# Patient Record
Sex: MALE | Race: BLACK OR AFRICAN AMERICAN | NOT HISPANIC OR LATINO
[De-identification: names, ages, dates, MRNs, and addresses within clinical notes are randomized per-mention and may not be internally consistent; named-entity substitution may affect disease eponyms.]

---

## 2017-01-04 ENCOUNTER — HOSPITAL (OUTPATIENT)
Dept: OTHER | Age: 72
End: 2017-01-04
Attending: INTERNAL MEDICINE

## 2017-01-11 ENCOUNTER — HOSPITAL (OUTPATIENT)
Dept: OTHER | Age: 72
End: 2017-01-11
Attending: INTERNAL MEDICINE

## 2017-01-11 LAB
A/G RATIO_: 1.1
ABS LYMPH: 0.8 K/CUMM (ref 1–3.5)
ABS MONO: 0.5 K/CUMM (ref 0.1–0.8)
ABS NEUTRO: 3.6 K/CUMM (ref 2–8)
ALBUMIN: 3.4 G/DL (ref 3.5–5)
ALK PHOS: 74 UNIT/L (ref 50–124)
ALT/GPT: 53 UNIT/L (ref 0–55)
ANION GAP SERPL CALC-SCNC: 18 MEQ/L (ref 10–20)
APTT PPP: 42 SECONDS (ref 22–30)
AST/GOT: 69 UNIT/L (ref 5–34)
BASOPHIL: 1 % (ref 0–1)
BILI TOTAL: 1.6 MG/DL (ref 0.2–1)
BUN SERPL-MCNC: 37 MG/DL (ref 6–20)
CALCIUM: 8.8 MG/DL (ref 8.4–10.2)
CHLORIDE: 92 MEQ/L (ref 97–107)
CREATININE: 7.23 MG/DL (ref 0.6–1.3)
DIFF_TYPE?: ABNORMAL
EOSINOPHIL: 0 % (ref 0–6)
GLOBULIN_: 3 G/DL (ref 2–4.1)
GLUCOSE LVL: 92 MG/DL (ref 70–99)
HCT VFR BLD CALC: 32 % (ref 36–51)
HEMOLYSIS 2+: NEGATIVE
HEMOLYSIS 4+: NEGATIVE
HGB BLD-MCNC: 10 G/DL (ref 12–17)
ICTERIC 4+: NEGATIVE
IMMATURE GRAN: 0.4 % (ref 0–0.3)
INR: 3.4 (ref 0.9–1.1)
INSTR WBC: 5 K/CUMM (ref 4–11)
LACTIC ACID: 1.1 MMOL/L (ref 0.5–2.2)
LACTIC ACID: 3.8 MMOL/L (ref 0.5–2.2)
LIPEMIC 1+: NEGATIVE
LIPEMIC 1+: NEGATIVE
LIPEMIC 3+: NEGATIVE
LYMPHOCYTE: 16 %
MCH RBC QN AUTO: 32 PG (ref 25–35)
MCHC RBC AUTO-ENTMCNC: 32 G/DL (ref 32–37)
MCV RBC AUTO: 100 FL (ref 78–97)
MONOCYTE: 10 %
NEUTROPHIL: 73 %
NRBC BLD MANUAL-RTO: 0.8 % (ref 0–0.2)
PLATELET: 89 K/CUMM (ref 150–450)
POTASSIUM: 4.9 MEQ/L (ref 3.5–5.1)
PROTHROMBIN TIME: 36 SECONDS (ref 9.7–11.6)
RBC # BLD: 3.14 M/CUMM (ref 4.2–6)
RDW: 17.9 % (ref 11.5–14.5)
SODIUM: 137 MEQ/L (ref 136–145)
TCO2: 32 MEQ/L (ref 19–29)
TOTAL PROTEIN: 6.4 G/DL (ref 6.4–8.3)
TROPONIN I: 0.1 NG/ML
TROPONIN I: 0.13 NG/ML
WBC # BLD: 5 K/CUMM (ref 4–11)

## 2017-01-12 LAB
ABS LYMPH: 0.8 K/CUMM (ref 1–3.5)
ABS MONO: 0.4 K/CUMM (ref 0.1–0.8)
ABS NEUTRO: 3 K/CUMM (ref 2–8)
ANION GAP SERPL CALC-SCNC: 16 MEQ/L (ref 10–20)
BASOPHIL: 1 % (ref 0–1)
BUN SERPL-MCNC: 47 MG/DL (ref 6–20)
CALCIUM: 9.1 MG/DL (ref 8.4–10.2)
CHLORIDE: 91 MEQ/L (ref 97–107)
CREATININE: 8.35 MG/DL (ref 0.6–1.3)
DIFF_TYPE?: ABNORMAL
EOSINOPHIL: 3 % (ref 0–6)
GLUCOSE LVL: 101 MG/DL (ref 70–99)
HBSAG: NON REACTIVE
HCT VFR BLD CALC: 32 % (ref 36–51)
HEMOLYSIS 2+: NEGATIVE
HEMOLYSIS 4+: NEGATIVE
HEP BS AG INSTR: 0.17
HGB BLD-MCNC: 10.2 G/DL (ref 12–17)
IMMATURE GRAN: 0.2 % (ref 0–0.3)
INR: 3.8 (ref 0.9–1.1)
INSTR WBC: 4.3 K/CUMM (ref 4–11)
LYMPHOCYTE: 18 %
MCH RBC QN AUTO: 32 PG (ref 25–35)
MCHC RBC AUTO-ENTMCNC: 32 G/DL (ref 32–37)
MCV RBC AUTO: 100 FL (ref 78–97)
MONOCYTE: 10 %
NEUTROPHIL: 69 %
NRBC BLD MANUAL-RTO: 0.9 % (ref 0–0.2)
PLATELET: 76 K/CUMM (ref 150–450)
POTASSIUM: 5.4 MEQ/L (ref 3.5–5.1)
PROTHROMBIN TIME: 40.3 SECONDS (ref 9.7–11.6)
RBC # BLD: 3.21 M/CUMM (ref 4.2–6)
RDW: 17.4 % (ref 11.5–14.5)
SODIUM: 136 MEQ/L (ref 136–145)
TCO2: 34 MEQ/L (ref 19–29)
WBC # BLD: 4.3 K/CUMM (ref 4–11)

## 2017-01-13 LAB
INR: 2.4 (ref 0.9–1.1)
PROTHROMBIN TIME: 24.6 SECONDS (ref 9.7–11.6)

## 2017-01-14 LAB
ABS NEUTRO: 3.5 K/CUMM (ref 2–8)
ANION GAP SERPL CALC-SCNC: 16 MEQ/L (ref 10–20)
BUN SERPL-MCNC: 46 MG/DL (ref 6–20)
CALCIUM: 8.7 MG/DL (ref 8.4–10.2)
CHLORIDE: 96 MEQ/L (ref 97–107)
CREATININE: 7.58 MG/DL (ref 0.6–1.3)
GLUCOSE LVL: 110 MG/DL (ref 70–99)
HCT VFR BLD CALC: 31 % (ref 36–51)
HEMOLYSIS 2+: NEGATIVE
HGB BLD-MCNC: 10.1 G/DL (ref 12–17)
INR: 2.4 (ref 0.9–1.1)
INSTR WBC: 4.8 K/CUMM (ref 4–11)
LEGION PN UR AG: NORMAL
MCH RBC QN AUTO: 32 PG (ref 25–35)
MCHC RBC AUTO-ENTMCNC: 32 G/DL (ref 32–37)
MCV RBC AUTO: 98 FL (ref 78–97)
NRBC BLD MANUAL-RTO: 0.8 % (ref 0–0.2)
PLATELET: 82 K/CUMM (ref 150–450)
POTASSIUM: 4.5 MEQ/L (ref 3.5–5.1)
PROCALCITONIN LVL: 6.14
PROTHROMBIN TIME: 24.8 SECONDS (ref 9.7–11.6)
RBC # BLD: 3.2 M/CUMM (ref 4.2–6)
RDW: 17.4 % (ref 11.5–14.5)
SODIUM: 136 MEQ/L (ref 136–145)
TCO2: 29 MEQ/L (ref 19–29)
WBC # BLD: 4.8 K/CUMM (ref 4–11)

## 2017-01-15 LAB
INR: 2.8 (ref 0.9–1.1)
PROTHROMBIN TIME: 29.5 SECONDS (ref 9.7–11.6)

## 2017-01-16 LAB
ANION GAP SERPL CALC-SCNC: 16 MEQ/L (ref 10–20)
BUN SERPL-MCNC: 47 MG/DL (ref 6–20)
CALCIUM: 8.9 MG/DL (ref 8.4–10.2)
CHLORIDE: 97 MEQ/L (ref 97–107)
CREATININE: 7.5 MG/DL (ref 0.6–1.3)
GLUCOSE LVL: 161 MG/DL (ref 70–99)
HEMOLYSIS 2+: NEGATIVE
INR: 3.1 (ref 0.9–1.1)
POTASSIUM: 5 MEQ/L (ref 3.5–5.1)
PROTHROMBIN TIME: 33.1 SECONDS (ref 9.7–11.6)
SODIUM: 135 MEQ/L (ref 136–145)
TCO2: 27 MEQ/L (ref 19–29)

## 2017-01-17 LAB
ALBUMIN: 3.1 G/DL (ref 3.5–5)
ANION GAP SERPL CALC-SCNC: 20 MEQ/L (ref 10–20)
BUN SERPL-MCNC: 68 MG/DL (ref 6–20)
CALCIUM: 8.6 MG/DL (ref 8.4–10.2)
CHLORIDE: 98 MEQ/L (ref 97–107)
CORR CA*PHOS: 66
CORR CALCIUM: 9.3 MG/DL (ref 8.4–10.2)
CREATININE: 9.09 MG/DL (ref 0.6–1.3)
GLUCOSE LVL: 222 MG/DL (ref 70–99)
HEMOLYSIS 2+: NEGATIVE
INR: 4.6 (ref 0.9–1.1)
LIPEMIC 3+: NEGATIVE
PHOSPHORUS: 7.1 MG/DL (ref 2.3–4.7)
POTASSIUM: 4.8 MEQ/L (ref 3.5–5.1)
PROTHROMBIN TIME: 49.1 SECONDS (ref 9.7–11.6)
SODIUM: 136 MEQ/L (ref 136–145)
TCO2: 23 MEQ/L (ref 19–29)

## 2017-01-20 ENCOUNTER — HOSPITAL (OUTPATIENT)
Dept: OTHER | Age: 72
End: 2017-01-20
Attending: INTERNAL MEDICINE

## 2017-02-02 ENCOUNTER — HOSPITAL (OUTPATIENT)
Dept: OTHER | Age: 72
End: 2017-02-02
Attending: INTERNAL MEDICINE

## 2017-02-02 LAB
ABS LYMPH: 0.7 K/CUMM (ref 1–3.5)
ABS MONO: 0.6 K/CUMM (ref 0.1–0.8)
ABS NEUTRO: 4 K/CUMM (ref 2–8)
ANION GAP SERPL CALC-SCNC: 14 MEQ/L (ref 10–20)
APTT PPP: 32 SECONDS (ref 22–30)
BASOPHIL: 1 % (ref 0–1)
BNP: >5000 PG/ML (ref 1–100)
BUN SERPL-MCNC: 30 MG/DL (ref 6–20)
CALCIUM: 9 MG/DL (ref 8.4–10.2)
CHLORIDE: 93 MEQ/L (ref 97–107)
CONTROL LINE: PRESENT
CREATININE: 4.79 MG/DL (ref 0.6–1.3)
DIFF_TYPE?: ABNORMAL
EOSINOPHIL: 2 % (ref 0–6)
GLUCOSE LVL: 117 MG/DL (ref 70–99)
HCT VFR BLD CALC: 32 % (ref 36–51)
HEMOLYSIS 2+: NEGATIVE
HEMOLYSIS 4+: NEGATIVE
HGB BLD-MCNC: 10.3 G/DL (ref 12–17)
ICTERIC 4+: NEGATIVE
IMMATURE GRAN: 0.4 % (ref 0–0.3)
INFLUENZ A: NORMAL
INFLUENZ B: NORMAL
INFLUENZ COMMNT: NORMAL
INR: 1.6 (ref 0.9–1.1)
INSTR WBC: 5.5 K/CUMM (ref 4–11)
LYMPHOCYTE: 12 %
MCH RBC QN AUTO: 32 PG (ref 25–35)
MCHC RBC AUTO-ENTMCNC: 32 G/DL (ref 32–37)
MCV RBC AUTO: 98 FL (ref 78–97)
MONOCYTE: 11 %
NEUTROPHIL: 74 %
NRBC BLD MANUAL-RTO: 0 % (ref 0–0.2)
PLATELET: 118 K/CUMM (ref 150–450)
POTASSIUM: 4.4 MEQ/L (ref 3.5–5.1)
PROCALCITONIN LVL: 23.44
PROTHROMBIN TIME: 16.4 SECONDS (ref 9.7–11.6)
RBC # BLD: 3.27 M/CUMM (ref 4.2–6)
RDW: 17.5 % (ref 11.5–14.5)
SODIUM: 141 MEQ/L (ref 136–145)
TCO2: 38 MEQ/L (ref 19–29)
TROPONIN I: 0.12 NG/ML
TROPONIN I: 0.13 NG/ML
TROPONIN I: 0.14 NG/ML
WBC # BLD: 5.5 K/CUMM (ref 4–11)

## 2017-02-03 LAB
ABS LYMPH: 0.8 K/CUMM (ref 1–3.5)
ABS MONO: 0.5 K/CUMM (ref 0.1–0.8)
ABS NEUTRO: 2.5 K/CUMM (ref 2–8)
ANION GAP SERPL CALC-SCNC: 16 MEQ/L (ref 10–20)
APTT PPP: 147 SECONDS (ref 22–30)
APTT PPP: 61 SECONDS (ref 22–30)
APTT PPP: 64 SECONDS (ref 22–30)
APTT PPP: 89 SECONDS (ref 22–30)
BASOPHIL: 2 % (ref 0–1)
BUN SERPL-MCNC: 44 MG/DL (ref 6–20)
CALCIUM: 8.5 MG/DL (ref 8.4–10.2)
CHLORIDE: 94 MEQ/L (ref 97–107)
CREATININE: 6.49 MG/DL (ref 0.6–1.3)
DIFF_TYPE?: ABNORMAL
EOSINOPHIL: 2 % (ref 0–6)
GLUCOSE LVL: 120 MG/DL (ref 70–99)
HCT VFR BLD CALC: 29 % (ref 36–51)
HEMOLYSIS 2+: NEGATIVE
HGB BLD-MCNC: 9 G/DL (ref 12–17)
HSV PCR SOURCE: NORMAL
HSV TYPE 1: NOT DETECTED
HSV TYPE 2: NOT DETECTED
IMMATURE GRAN: 0.5 % (ref 0–0.3)
INR: 1.7 (ref 0.9–1.1)
INSTR WBC: 4 K/CUMM (ref 4–11)
LYMPHOCYTE: 21 %
MCH RBC QN AUTO: 31 PG (ref 25–35)
MCHC RBC AUTO-ENTMCNC: 31 G/DL (ref 32–37)
MCV RBC AUTO: 99 FL (ref 78–97)
MONOCYTE: 13 %
NEUTROPHIL: 62 %
NRBC BLD MANUAL-RTO: 0 % (ref 0–0.2)
PLATELET: 117 K/CUMM (ref 150–450)
PLT ESTIMATE: ADEQUATE
POTASSIUM: 4.3 MEQ/L (ref 3.5–5.1)
PROTHROMBIN TIME: 17.5 SECONDS (ref 9.7–11.6)
RBC # BLD: 2.93 M/CUMM (ref 4.2–6)
RBC MORPH2: ABNORMAL
RBC MORPH: ABNORMAL
RDW: 17.7 % (ref 11.5–14.5)
SODIUM: 141 MEQ/L (ref 136–145)
TCO2: 35 MEQ/L (ref 19–29)
WBC # BLD: 4 K/CUMM (ref 4–11)

## 2017-02-04 LAB
A/G RATIO_: 0.9
ABS LYMPH: 0.8 K/CUMM (ref 1–3.5)
ABS MONO: 0.4 K/CUMM (ref 0.1–0.8)
ABS NEUTRO: 2.7 K/CUMM (ref 2–8)
ALBUMIN: 3 G/DL (ref 3.5–5)
ALK PHOS: 75 UNIT/L (ref 50–124)
ALT/GPT: 39 UNIT/L (ref 0–55)
ANION GAP SERPL CALC-SCNC: 22 MEQ/L (ref 10–20)
APTT PPP: 51 SECONDS (ref 22–30)
APTT PPP: 59 SECONDS (ref 22–30)
APTT PPP: 85 SECONDS (ref 22–30)
AST/GOT: 27 UNIT/L (ref 5–34)
BASOPHIL: 1 % (ref 0–1)
BILI TOTAL: 1 MG/DL (ref 0.2–1)
BUN SERPL-MCNC: 68 MG/DL (ref 6–20)
CALCIUM: 8.5 MG/DL (ref 8.4–10.2)
CHLORIDE: 92 MEQ/L (ref 97–107)
CREATININE: 8.45 MG/DL (ref 0.6–1.3)
DIFF_TYPE?: ABNORMAL
EOSINOPHIL: 5 % (ref 0–6)
GLOBULIN_: 3.3 G/DL (ref 2–4.1)
GLUCOSE LVL: 87 MG/DL (ref 70–99)
HCT VFR BLD CALC: 30 % (ref 36–51)
HEMOLYSIS 2+: NEGATIVE
HGB BLD-MCNC: 9.9 G/DL (ref 12–17)
IMMATURE GRAN: 0.2 % (ref 0–0.3)
INR: 1.7 (ref 0.9–1.1)
INSTR WBC: 4.1 K/CUMM (ref 4–11)
LIPEMIC 3+: NEGATIVE
LYMPHOCYTE: 18 %
MCH RBC QN AUTO: 31 PG (ref 25–35)
MCHC RBC AUTO-ENTMCNC: 32 G/DL (ref 32–37)
MCV RBC AUTO: 96 FL (ref 78–97)
MONOCYTE: 10 %
NEUTROPHIL: 65 %
NRBC BLD MANUAL-RTO: 0 % (ref 0–0.2)
PLATELET: 118 K/CUMM (ref 150–450)
POTASSIUM: 4.8 MEQ/L (ref 3.5–5.1)
PROTHROMBIN TIME: 18.1 SECONDS (ref 9.7–11.6)
RBC # BLD: 3.17 M/CUMM (ref 4.2–6)
RDW: 17.2 % (ref 11.5–14.5)
REF LAB RESULT: NORMAL
REF TEST NAME: NORMAL
SODIUM: 138 MEQ/L (ref 136–145)
TCO2: 29 MEQ/L (ref 19–29)
TOTAL PROTEIN: 6.3 G/DL (ref 6.4–8.3)
TREP PALL TP-PA: NONREACTIVE
VAR ZOS IGM: 5.01 OD RATIO
VARI ZOS IGG: 7.61 OD RATIO
WBC # BLD: 4.1 K/CUMM (ref 4–11)

## 2017-02-05 LAB
A/G RATIO_: 1
ABS LYMPH: 0.7 K/CUMM (ref 1–3.5)
ABS MONO: 0.4 K/CUMM (ref 0.1–0.8)
ABS NEUTRO: 3.2 K/CUMM (ref 2–8)
ALBUMIN: 2.9 G/DL (ref 3.5–5)
ALK PHOS: 76 UNIT/L (ref 50–124)
ALT/GPT: 30 UNIT/L (ref 0–55)
ANION GAP SERPL CALC-SCNC: 25 MEQ/L (ref 10–20)
APTT PPP: 51 SECONDS (ref 22–30)
AST/GOT: 18 UNIT/L (ref 5–34)
BASOPHIL: 1 % (ref 0–1)
BILI TOTAL: 0.8 MG/DL (ref 0.2–1)
BUN SERPL-MCNC: 89 MG/DL (ref 6–20)
CALCIUM: 8.2 MG/DL (ref 8.4–10.2)
CHLORIDE: 90 MEQ/L (ref 97–107)
CREATININE: 10.04 MG/DL (ref 0.6–1.3)
CRP INFLAMMATION: 73.7 MG/L (ref 0.1–8.2)
DEVICE SN: NORMAL
DIFF_TYPE?: ABNORMAL
EOSINOPHIL: 6 % (ref 0–6)
GLOBULIN_: 2.9 G/DL (ref 2–4.1)
GLUCOSE LVL: 106 MG/DL (ref 70–99)
HCT VFR BLD CALC: 30 % (ref 36–51)
HGB BLD-MCNC: 10 G/DL (ref 12–17)
IMMATURE GRAN: 0.9 % (ref 0–0.3)
INR: 2.4 (ref 0.9–1.1)
INSTR WBC: 4.6 K/CUMM (ref 4–11)
LYMPHOCYTE: 15 %
MCH RBC QN AUTO: 32 PG (ref 25–35)
MCHC RBC AUTO-ENTMCNC: 33 G/DL (ref 32–37)
MCV RBC AUTO: 96 FL (ref 78–97)
MONOCYTE: 8 %
NEUTROPHIL: 69 %
NRBC BLD MANUAL-RTO: 0.4 % (ref 0–0.2)
PHOSPHORUS: 5.3 MG/DL (ref 2.3–4.7)
PLATELET: 117 K/CUMM (ref 150–450)
POC_GLU: 84 MG/DL (ref 70–99)
POTASSIUM: 5.6 MEQ/L (ref 3.5–5.1)
PROCALCITONIN LVL: 9.27
PROTHROMBIN TIME: 25.3 SECONDS (ref 9.7–11.6)
RBC # BLD: 3.17 M/CUMM (ref 4.2–6)
RDW: 17.2 % (ref 11.5–14.5)
SED RATE: 32 MM/HR (ref 0–20)
SODIUM: 136 MEQ/L (ref 136–145)
TCO2: 27 MEQ/L (ref 19–29)
TECH_ID: NORMAL
TOTAL PROTEIN: 5.8 G/DL (ref 6.4–8.3)
WBC # BLD: 4.6 K/CUMM (ref 4–11)

## 2017-02-06 LAB
ABS LYMPH: 0.6 K/CUMM (ref 1–3.5)
ABS MONO: 0.3 K/CUMM (ref 0.1–0.8)
ABS NEUTRO: 3.2 K/CUMM (ref 2–8)
ANION GAP SERPL CALC-SCNC: 22 MEQ/L (ref 10–20)
APTT PPP: 43 SECONDS (ref 22–30)
APTT PPP: 92 SECONDS (ref 22–30)
BASOPHIL: 1 % (ref 0–1)
BUN SERPL-MCNC: 96 MG/DL (ref 6–20)
CALCIUM: 8.2 MG/DL (ref 8.4–10.2)
CHLORIDE: 88 MEQ/L (ref 97–107)
CREATININE: 10.85 MG/DL (ref 0.6–1.3)
DIFF_TYPE?: ABNORMAL
EOSINOPHIL: 6 % (ref 0–6)
GLUCOSE LVL: 99 MG/DL (ref 70–99)
HCT VFR BLD CALC: 31 % (ref 36–51)
HEMOLYSIS 2+: NEGATIVE
HEMOLYSIS 3+: NEGATIVE
HGB BLD-MCNC: 10.3 G/DL (ref 12–17)
IMMATURE GRAN: 0.7 % (ref 0–0.3)
INR: 4 (ref 0.9–1.1)
INSTR WBC: 4.4 K/CUMM (ref 4–11)
LIPEMIC 4+: NEGATIVE
LYMPHOCYTE: 13 %
MCH RBC QN AUTO: 31 PG (ref 25–35)
MCHC RBC AUTO-ENTMCNC: 33 G/DL (ref 32–37)
MCV RBC AUTO: 94 FL (ref 78–97)
MONOCYTE: 6 %
NEUTROPHIL: 73 %
NRBC BLD MANUAL-RTO: 0.5 % (ref 0–0.2)
PHOSPHORUS: 6.6 MG/DL (ref 2.3–4.7)
PLATELET: 131 K/CUMM (ref 150–450)
POTASSIUM: 4.9 MEQ/L (ref 3.5–5.1)
PROTHROMBIN TIME: 42.5 SECONDS (ref 9.7–11.6)
RBC # BLD: 3.28 M/CUMM (ref 4.2–6)
RDW: 17.1 % (ref 11.5–14.5)
REF LAB RESULT: NORMAL
REF TEST NAME: NORMAL
SODIUM: 134 MEQ/L (ref 136–145)
TCO2: 29 MEQ/L (ref 19–29)
WBC # BLD: 4.4 K/CUMM (ref 4–11)

## 2017-02-07 LAB
ABS LYMPH: 0.6 K/CUMM (ref 1–3.5)
ABS MONO: 0.3 K/CUMM (ref 0.1–0.8)
ABS NEUTRO: 4 K/CUMM (ref 2–8)
ANION GAP SERPL CALC-SCNC: 14 MEQ/L (ref 10–20)
BASOPHIL: 1 % (ref 0–1)
BUN SERPL-MCNC: 42 MG/DL (ref 6–20)
CALCIUM: 8.3 MG/DL (ref 8.4–10.2)
CHLORIDE: 95 MEQ/L (ref 97–107)
CREATININE: 7.16 MG/DL (ref 0.6–1.3)
DIFF_TYPE?: ABNORMAL
EOSINOPHIL: 5 % (ref 0–6)
GLUCOSE LVL: 101 MG/DL (ref 70–99)
HCT VFR BLD CALC: 32 % (ref 36–51)
HEMOLYSIS 2+: NEGATIVE
HEMOLYSIS 3+: NEGATIVE
HGB BLD-MCNC: 10.1 G/DL (ref 12–17)
IMMATURE GRAN: 0.6 % (ref 0–0.3)
INR: 4.6 (ref 0.9–1.1)
INSTR WBC: 5.3 K/CUMM (ref 4–11)
LIPEMIC 4+: NEGATIVE
LYMPHOCYTE: 12 %
MCH RBC QN AUTO: 31 PG (ref 25–35)
MCHC RBC AUTO-ENTMCNC: 32 G/DL (ref 32–37)
MCV RBC AUTO: 97 FL (ref 78–97)
MONOCYTE: 6 %
NEUTROPHIL: 76 %
NRBC BLD MANUAL-RTO: 0 % (ref 0–0.2)
PHOSPHORUS: 5.5 MG/DL (ref 2.3–4.7)
PLATELET: 141 K/CUMM (ref 150–450)
POTASSIUM: 4.4 MEQ/L (ref 3.5–5.1)
PROTHROMBIN TIME: 49.3 SECONDS (ref 9.7–11.6)
RBC # BLD: 3.25 M/CUMM (ref 4.2–6)
RDW: 17.6 % (ref 11.5–14.5)
SODIUM: 137 MEQ/L (ref 136–145)
TCO2: 32 MEQ/L (ref 19–29)
WBC # BLD: 5.3 K/CUMM (ref 4–11)

## 2017-02-08 LAB
ANION GAP SERPL CALC-SCNC: 11 MEQ/L (ref 10–20)
BUN SERPL-MCNC: 23 MG/DL (ref 6–20)
CALCIUM: 8.9 MG/DL (ref 8.4–10.2)
CHLORIDE: 97 MEQ/L (ref 97–107)
CREATININE: 5.41 MG/DL (ref 0.6–1.3)
GLUCOSE LVL: 82 MG/DL (ref 70–99)
HEMOLYSIS 2+: NEGATIVE
HEMOLYSIS 3+: NEGATIVE
INR: 2.5 (ref 0.9–1.1)
LIPEMIC 4+: NEGATIVE
PHOSPHORUS: 4.7 MG/DL (ref 2.3–4.7)
POTASSIUM: 4.2 MEQ/L (ref 3.5–5.1)
PROTHROMBIN TIME: 25.8 SECONDS (ref 9.7–11.6)
SODIUM: 137 MEQ/L (ref 136–145)
TCO2: 33 MEQ/L (ref 19–29)

## 2017-02-09 LAB
ABS LYMPH: 1 K/CUMM (ref 1–3.5)
ABS MONO: 0.4 K/CUMM (ref 0.1–0.8)
ABS NEUTRO: 3.6 K/CUMM (ref 2–8)
ANION GAP SERPL CALC-SCNC: 14 MEQ/L (ref 10–20)
BASOPHIL: 1 % (ref 0–1)
BUN SERPL-MCNC: 36 MG/DL (ref 6–20)
CALCIUM: 8.4 MG/DL (ref 8.4–10.2)
CHLORIDE: 98 MEQ/L (ref 97–107)
CREATININE: 7.26 MG/DL (ref 0.6–1.3)
DIFF_TYPE?: ABNORMAL
EOSINOPHIL: 5 % (ref 0–6)
GLUCOSE LVL: 88 MG/DL (ref 70–99)
HCT VFR BLD CALC: 29 % (ref 36–51)
HEMOLYSIS 2+: NEGATIVE
HEMOLYSIS 2+: NEGATIVE
HEMOLYSIS 3+: NEGATIVE
HGB BLD-MCNC: 9.4 G/DL (ref 12–17)
IMMATURE GRAN: 0.6 % (ref 0–0.3)
INR: 2.2 (ref 0.9–1.1)
INSTR WBC: 5.3 K/CUMM (ref 4–11)
LIPEMIC 4+: NEGATIVE
LYMPHOCYTE: 18 %
MAGNESIUM LEVEL: 2.1 MG/DL (ref 1.6–2.6)
MCH RBC QN AUTO: 31 PG (ref 25–35)
MCHC RBC AUTO-ENTMCNC: 32 G/DL (ref 32–37)
MCV RBC AUTO: 97 FL (ref 78–97)
MONOCYTE: 7 %
NEUTROPHIL: 68 %
NRBC BLD MANUAL-RTO: 0 % (ref 0–0.2)
PHOSPHORUS: 5.1 MG/DL (ref 2.3–4.7)
PLATELET: 142 K/CUMM (ref 150–450)
POTASSIUM: 4.3 MEQ/L (ref 3.5–5.1)
PROTHROMBIN TIME: 23.3 SECONDS (ref 9.7–11.6)
RBC # BLD: 3.02 M/CUMM (ref 4.2–6)
RDW: 17.9 % (ref 11.5–14.5)
SODIUM: 136 MEQ/L (ref 136–145)
TCO2: 28 MEQ/L (ref 19–29)
WBC # BLD: 5.3 K/CUMM (ref 4–11)

## 2017-02-10 LAB
ABS LYMPH: 0.7 K/CUMM (ref 1–3.5)
ABS MONO: 0.4 K/CUMM (ref 0.1–0.8)
ABS NEUTRO: 3.2 K/CUMM (ref 2–8)
ANION GAP SERPL CALC-SCNC: 13 MEQ/L (ref 10–20)
BASOPHIL: 1 % (ref 0–1)
BUN SERPL-MCNC: 20 MG/DL (ref 6–20)
CALCIUM: 8.5 MG/DL (ref 8.4–10.2)
CHLORIDE: 94 MEQ/L (ref 97–107)
CREATININE: 5.22 MG/DL (ref 0.6–1.3)
DIFF_TYPE?: ABNORMAL
EOSINOPHIL: 6 % (ref 0–6)
GLUCOSE LVL: 140 MG/DL (ref 70–99)
HCT VFR BLD CALC: 31 % (ref 36–51)
HEMOLYSIS 2+: NEGATIVE
HEMOLYSIS 3+: NEGATIVE
HGB BLD-MCNC: 10 G/DL (ref 12–17)
IMMATURE GRAN: 0.4 % (ref 0–0.3)
INR: 2.7 (ref 0.9–1.1)
INSTR WBC: 4.7 K/CUMM (ref 4–11)
LIPEMIC 4+: NEGATIVE
LYMPHOCYTE: 16 %
MCH RBC QN AUTO: 31 PG (ref 25–35)
MCHC RBC AUTO-ENTMCNC: 32 G/DL (ref 32–37)
MCV RBC AUTO: 98 FL (ref 78–97)
MONOCYTE: 8 %
NEUTROPHIL: 69 %
NRBC BLD MANUAL-RTO: 0.4 % (ref 0–0.2)
PHOSPHORUS: 3.9 MG/DL (ref 2.3–4.7)
PLATELET: 157 K/CUMM (ref 150–450)
POTASSIUM: 3.9 MEQ/L (ref 3.5–5.1)
PROTHROMBIN TIME: 27.9 SECONDS (ref 9.7–11.6)
RBC # BLD: 3.18 M/CUMM (ref 4.2–6)
RDW: 18.5 % (ref 11.5–14.5)
SODIUM: 132 MEQ/L (ref 136–145)
TCO2: 29 MEQ/L (ref 19–29)
WBC # BLD: 4.7 K/CUMM (ref 4–11)

## 2017-02-11 LAB
ABS LYMPH MAN: 0.8 K/CUMM (ref 1–3.5)
ABS MONO MAN: 0.3 K/CUMM (ref 0.1–0.8)
ABS NEUT MAN: 2.6 K/CUMM (ref 1.8–7.8)
ANION GAP SERPL CALC-SCNC: 15 MEQ/L (ref 10–20)
BASOPHIL MAN: 4 % (ref 0–1)
BUN SERPL-MCNC: 36 MG/DL (ref 6–20)
CALCIUM: 8.4 MG/DL (ref 8.4–10.2)
CHLORIDE: 94 MEQ/L (ref 97–107)
CREATININE: 7.44 MG/DL (ref 0.6–1.3)
DIFF_TYPE?: ABNORMAL
EOS MAN: 8 % (ref 0–4)
GLUCOSE LVL: 61 MG/DL (ref 70–99)
HCT VFR BLD CALC: 31 % (ref 36–51)
HEMOLYSIS 2+: NEGATIVE
HEMOLYSIS 3+: NEGATIVE
HGB BLD-MCNC: 10 G/DL (ref 12–17)
INR: 3.5 (ref 0.9–1.1)
INSTR WBC: 4.2 K/CUMM (ref 4–11)
LIPEMIC 4+: NEGATIVE
LYMPH MAN: 19 %
MCH RBC QN AUTO: 32 PG (ref 25–35)
MCHC RBC AUTO-ENTMCNC: 32 G/DL (ref 32–37)
MCV RBC AUTO: 98 FL (ref 78–97)
MONOCYTE MAN: 8 %
NRBC BLD MANUAL-RTO: 0.7 % (ref 0–0.2)
NRBC MAN: 1 K/100 WBC
PHOSPHORUS: 5 MG/DL (ref 2.3–4.7)
PLATELET: 186 K/CUMM (ref 150–450)
PLT ESTIMATE: ADEQUATE
POTASSIUM: 4.6 MEQ/L (ref 3.5–5.1)
PROTHROMBIN TIME: 37.5 SECONDS (ref 9.7–11.6)
RBC # BLD: 3.14 M/CUMM (ref 4.2–6)
RBC MORPH2: ABNORMAL
RBC MORPH3: ABNORMAL
RBC MORPH: ABNORMAL
RDW: 18.5 % (ref 11.5–14.5)
SEGS MAN: 61 %
SODIUM: 132 MEQ/L (ref 136–145)
TCO2: 28 MEQ/L (ref 19–29)
TOTAL LYMPHS MANUAL: 19 %
WBC # BLD: 4.2 K/CUMM (ref 4–11)

## 2017-03-22 ENCOUNTER — HOSPITAL (OUTPATIENT)
Dept: OTHER | Age: 72
End: 2017-03-22
Attending: INTERNAL MEDICINE

## 2017-05-17 ENCOUNTER — HOSPITAL (OUTPATIENT)
Dept: OTHER | Age: 72
End: 2017-05-17
Attending: INTERNAL MEDICINE

## 2017-05-17 LAB
AMIODARONE LVL: 0.7 UG/ML (ref 1.5–2.5)
DESTHAMI LVL: 0.6 UG/ML (ref 1.5–2.5)
T4 FREE: 1.3 NG/DL (ref 0.7–1.5)
TSH SERPL-ACNC: 2.89 MIU/ML (ref 0.4–5)

## 2017-10-06 ENCOUNTER — HOSPITAL (OUTPATIENT)
Dept: OTHER | Age: 72
End: 2017-10-06
Attending: INTERNAL MEDICINE

## 2017-10-06 LAB
BUN SERPL-MCNC: 58 MG/DL (ref 6–20)
CREATININE: 8.29 MG/DL (ref 0.6–1.3)

## 2017-11-22 ENCOUNTER — HOSPITAL (OUTPATIENT)
Dept: OTHER | Age: 72
End: 2017-11-22
Attending: INTERNAL MEDICINE

## 2018-03-07 ENCOUNTER — CHARTING TRANS (OUTPATIENT)
Dept: OTHER | Age: 73
End: 2018-03-07

## 2018-04-23 ENCOUNTER — HOSPITAL (OUTPATIENT)
Dept: OTHER | Age: 73
End: 2018-04-23
Attending: INTERNAL MEDICINE

## 2018-05-24 ENCOUNTER — HOSPITAL (OUTPATIENT)
Dept: OTHER | Age: 73
End: 2018-05-24
Attending: FAMILY MEDICINE

## 2018-06-08 ENCOUNTER — HOSPITAL (OUTPATIENT)
Dept: OTHER | Age: 73
End: 2018-06-08
Attending: INTERNAL MEDICINE

## 2018-08-19 ENCOUNTER — HOSPITAL (OUTPATIENT)
Dept: OTHER | Age: 73
End: 2018-08-19
Attending: EMERGENCY MEDICINE

## 2018-08-19 LAB
A/G RATIO_: 0.9
ABS LYMPH: 1 K/CUMM (ref 1–3.5)
ABS MONO: 0.6 K/CUMM (ref 0.1–0.8)
ABS NEUTRO: 6.1 K/CUMM (ref 2–8)
ALBUMIN: 3.8 G/DL (ref 3.5–5)
ALK PHOS: 97 UNIT/L (ref 50–124)
ALT/GPT: 15 UNIT/L (ref 0–55)
ANION GAP SERPL CALC-SCNC: 22 MEQ/L (ref 10–20)
AST/GOT: 25 UNIT/L (ref 5–34)
BASOPHIL: 1 % (ref 0–1)
BILI TOTAL: 0.8 MG/DL (ref 0.2–1)
BUN SERPL-MCNC: 44 MG/DL (ref 6–20)
CALCIUM: 10.1 MG/DL (ref 8.4–10.2)
CHLORIDE: 92 MEQ/L (ref 97–107)
CREATININE: 9.66 MG/DL (ref 0.6–1.3)
DIFF_TYPE?: ABNORMAL
EOSINOPHIL: 4 % (ref 0–6)
GLOBULIN_: 4.1 G/DL (ref 2–4.1)
GLUCOSE LVL: 102 MG/DL (ref 70–99)
HCT VFR BLD CALC: 41 % (ref 36–51)
HEMOLYSIS 2+: NEGATIVE
HGB BLD-MCNC: 12.6 G/DL (ref 12–17)
ICTERIC 4+: NEGATIVE
IMMATURE GRAN: 0.4 % (ref 0–0.3)
INSTR WBC: 8.2 K/CUMM (ref 4–11)
LIPASE LEVEL: 32 UNIT/L (ref 8–78)
LIPEMIC 3+: NEGATIVE
LYMPHOCYTE: 12 %
MCH RBC QN AUTO: 31 PG (ref 25–35)
MCHC RBC AUTO-ENTMCNC: 31 G/DL (ref 32–37)
MCV RBC AUTO: 101 FL (ref 78–97)
MONOCYTE: 8 %
NEUTROPHIL: 74 %
NRBC BLD MANUAL-RTO: 0.5 % (ref 0–0.2)
PLATELET: 197 K/CUMM (ref 150–450)
PLT ESTIMATE: ADEQUATE
POTASSIUM: 5.1 MEQ/L (ref 3.5–5.1)
RBC # BLD: 4.05 M/CUMM (ref 4.2–6)
RBC MORPH2: ABNORMAL
RBC MORPH3: ABNORMAL
RBC MORPH4: ABNORMAL
RBC MORPH: ABNORMAL
RDW: 20.3 % (ref 11.5–14.5)
SODIUM: 141 MEQ/L (ref 136–145)
TCO2: 32 MEQ/L (ref 19–29)
TOTAL PROTEIN: 7.9 G/DL (ref 6.4–8.3)
WBC # BLD: 8.2 K/CUMM (ref 4–11)

## 2018-09-21 ENCOUNTER — HOSPITAL (OUTPATIENT)
Dept: OTHER | Age: 73
End: 2018-09-21
Attending: FAMILY MEDICINE

## 2018-10-12 ENCOUNTER — HOSPITAL (OUTPATIENT)
Dept: OTHER | Age: 73
End: 2018-10-12
Attending: FAMILY MEDICINE

## 2018-11-01 VITALS
BODY MASS INDEX: 22.35 KG/M2 | HEIGHT: 72 IN | WEIGHT: 165 LBS | DIASTOLIC BLOOD PRESSURE: 54 MMHG | SYSTOLIC BLOOD PRESSURE: 109 MMHG

## 2018-11-28 ENCOUNTER — HOSPITAL (OUTPATIENT)
Dept: OTHER | Age: 73
End: 2018-11-28
Attending: INTERNAL MEDICINE

## 2018-12-17 RX ORDER — ASPIRIN 81 MG/1
TABLET ORAL
COMMUNITY

## 2018-12-17 RX ORDER — GABAPENTIN 400 MG/1
CAPSULE ORAL 2 TIMES DAILY
COMMUNITY

## 2018-12-17 RX ORDER — CARVEDILOL 6.25 MG/1
1 TABLET ORAL 2 TIMES DAILY
COMMUNITY

## 2019-01-01 ENCOUNTER — OFFICE VISIT (OUTPATIENT)
Dept: CARDIOLOGY | Age: 74
End: 2019-01-01

## 2019-01-01 ENCOUNTER — TELEPHONE (OUTPATIENT)
Dept: CARDIOLOGY | Age: 74
End: 2019-01-01

## 2019-01-01 ENCOUNTER — TELEPHONE (OUTPATIENT)
Dept: FAMILY MEDICINE | Age: 74
End: 2019-01-01

## 2019-01-01 ENCOUNTER — OFFICE VISIT (OUTPATIENT)
Dept: SURGERY | Age: 74
End: 2019-01-01

## 2019-01-01 ENCOUNTER — EXTERNAL RECORD (OUTPATIENT)
Dept: HEALTH INFORMATION MANAGEMENT | Facility: OTHER | Age: 74
End: 2019-01-01

## 2019-01-01 VITALS
SYSTOLIC BLOOD PRESSURE: 122 MMHG | HEART RATE: 84 BPM | BODY MASS INDEX: 22.62 KG/M2 | WEIGHT: 167 LBS | DIASTOLIC BLOOD PRESSURE: 58 MMHG | HEIGHT: 72 IN

## 2019-01-01 VITALS
WEIGHT: 165.12 LBS | SYSTOLIC BLOOD PRESSURE: 130 MMHG | HEIGHT: 72 IN | DIASTOLIC BLOOD PRESSURE: 58 MMHG | BODY MASS INDEX: 22.37 KG/M2 | HEART RATE: 75 BPM

## 2019-01-01 VITALS
DIASTOLIC BLOOD PRESSURE: 53 MMHG | HEART RATE: 83 BPM | SYSTOLIC BLOOD PRESSURE: 104 MMHG | BODY MASS INDEX: 22.35 KG/M2 | HEIGHT: 72 IN | WEIGHT: 165 LBS

## 2019-01-01 DIAGNOSIS — I48.0 PAROXYSMAL ATRIAL FIBRILLATION (CMD): Primary | ICD-10-CM

## 2019-01-01 DIAGNOSIS — I10 BENIGN ESSENTIAL HTN: ICD-10-CM

## 2019-01-01 DIAGNOSIS — I71.50 RUPTURED ANEURYSM OF THORACOABDOMINAL AORTA (CMD): ICD-10-CM

## 2019-01-01 DIAGNOSIS — N18.6 ESRD (END STAGE RENAL DISEASE) (CMD): ICD-10-CM

## 2019-01-01 DIAGNOSIS — I42.0 NONISCHEMIC DILATED CARDIOMYOPATHY (CMD): ICD-10-CM

## 2019-01-01 DIAGNOSIS — I77.0 AVF (ARTERIOVENOUS FISTULA) (CMD): ICD-10-CM

## 2019-01-01 DIAGNOSIS — I71.019 DISSECTION OF THORACIC AORTA (CMD): ICD-10-CM

## 2019-01-01 DIAGNOSIS — R06.02 SOB (SHORTNESS OF BREATH) ON EXERTION: ICD-10-CM

## 2019-01-01 DIAGNOSIS — I71.10 RUPTURED ANEURYSM OF THORACIC AORTA (CMD): ICD-10-CM

## 2019-01-01 DIAGNOSIS — T82.898A PROBLEM WITH DIALYSIS ACCESS, INITIAL ENCOUNTER (CMD): Primary | ICD-10-CM

## 2019-01-01 PROCEDURE — 99232 SBSQ HOSP IP/OBS MODERATE 35: CPT | Performed by: INTERNAL MEDICINE

## 2019-01-01 PROCEDURE — 93000 ELECTROCARDIOGRAM COMPLETE: CPT | Performed by: NURSE PRACTITIONER

## 2019-01-01 PROCEDURE — 99291 CRITICAL CARE FIRST HOUR: CPT | Performed by: INTERNAL MEDICINE

## 2019-01-01 PROCEDURE — 99233 SBSQ HOSP IP/OBS HIGH 50: CPT | Performed by: INTERNAL MEDICINE

## 2019-01-01 PROCEDURE — 99215 OFFICE O/P EST HI 40 MIN: CPT | Performed by: NURSE PRACTITIONER

## 2019-01-01 PROCEDURE — 99215 OFFICE O/P EST HI 40 MIN: CPT | Performed by: INTERNAL MEDICINE

## 2019-01-01 PROCEDURE — 99214 OFFICE O/P EST MOD 30 MIN: CPT | Performed by: SURGERY

## 2019-01-01 PROCEDURE — 99214 OFFICE O/P EST MOD 30 MIN: CPT | Performed by: CLINICAL NURSE SPECIALIST

## 2019-01-01 PROCEDURE — 99232 SBSQ HOSP IP/OBS MODERATE 35: CPT | Performed by: CLINICAL NURSE SPECIALIST

## 2019-01-01 PROCEDURE — 99232 SBSQ HOSP IP/OBS MODERATE 35: CPT | Performed by: NURSE PRACTITIONER

## 2019-01-01 RX ORDER — DIPHENOXYLATE HYDROCHLORIDE AND ATROPINE SULFATE 2.5; .025 MG/1; MG/1
TABLET ORAL
COMMUNITY

## 2019-01-01 RX ORDER — SILDENAFIL 50 MG/1
50 TABLET, FILM COATED ORAL PRN
Qty: 10 TABLET | Refills: 3 | Status: SHIPPED | OUTPATIENT
Start: 2019-01-01

## 2019-01-01 RX ORDER — CHLORAL HYDRATE 500 MG
CAPSULE ORAL
COMMUNITY

## 2019-01-01 RX ORDER — TRAMADOL HYDROCHLORIDE 50 MG/1
TABLET ORAL
COMMUNITY

## 2019-01-01 RX ORDER — ALBUTEROL SULFATE 90 UG/1
AEROSOL, METERED RESPIRATORY (INHALATION)
COMMUNITY

## 2019-01-01 RX ORDER — LIDOCAINE 40 MG/G
CREAM TOPICAL
COMMUNITY

## 2019-01-01 RX ORDER — BUDESONIDE AND FORMOTEROL FUMARATE DIHYDRATE 160; 4.5 UG/1; UG/1
AEROSOL RESPIRATORY (INHALATION)
COMMUNITY

## 2019-01-01 RX ORDER — IPRATROPIUM BROMIDE AND ALBUTEROL SULFATE 2.5; .5 MG/3ML; MG/3ML
SOLUTION RESPIRATORY (INHALATION)
COMMUNITY

## 2019-01-01 RX ORDER — B-COMPLEX WITH VITAMIN C
TABLET ORAL
COMMUNITY

## 2019-01-01 RX ORDER — FOLIC ACID 1 MG/1
TABLET ORAL
COMMUNITY

## 2019-01-01 RX ORDER — METOPROLOL SUCCINATE 25 MG/1
TABLET, EXTENDED RELEASE ORAL
COMMUNITY

## 2019-01-01 RX ORDER — TAMSULOSIN HYDROCHLORIDE 0.4 MG/1
CAPSULE ORAL
COMMUNITY

## 2019-01-01 RX ORDER — AMIODARONE HYDROCHLORIDE 200 MG/1
100 TABLET ORAL DAILY
COMMUNITY

## 2019-01-01 RX ORDER — ISOSORBIDE MONONITRATE 30 MG/1
15 TABLET, EXTENDED RELEASE ORAL DAILY
COMMUNITY
End: 2019-01-01 | Stop reason: ALTCHOICE

## 2019-01-01 RX ORDER — TIOTROPIUM BROMIDE 18 UG/1
CAPSULE ORAL; RESPIRATORY (INHALATION)
COMMUNITY

## 2019-01-01 SDOH — HEALTH STABILITY: MENTAL HEALTH: HOW OFTEN DO YOU HAVE A DRINK CONTAINING ALCOHOL?: NEVER

## 2019-01-01 SDOH — HEALTH STABILITY: PHYSICAL HEALTH: ON AVERAGE, HOW MANY MINUTES DO YOU ENGAGE IN EXERCISE AT THIS LEVEL?: PATIENT DECLINED

## 2019-01-01 SDOH — HEALTH STABILITY: PHYSICAL HEALTH
ON AVERAGE, HOW MANY DAYS PER WEEK DO YOU ENGAGE IN MODERATE TO STRENUOUS EXERCISE (LIKE A BRISK WALK)?: PATIENT DECLINED

## 2019-01-01 ASSESSMENT — PATIENT HEALTH QUESTIONNAIRE - PHQ9
2. FEELING DOWN, DEPRESSED OR HOPELESS: NOT AT ALL
SUM OF ALL RESPONSES TO PHQ9 QUESTIONS 1 AND 2: 0
SUM OF ALL RESPONSES TO PHQ9 QUESTIONS 1 AND 2: 0
1. LITTLE INTEREST OR PLEASURE IN DOING THINGS: NOT AT ALL

## 2019-02-20 PROBLEM — I71.50: Status: ACTIVE | Noted: 2019-01-01

## 2019-02-20 PROBLEM — Z95.2 HX OF MECHANICAL AORTIC VALVE REPLACEMENT: Status: ACTIVE | Noted: 2019-01-01

## 2019-03-21 PROBLEM — T82.898A PROBLEM WITH DIALYSIS ACCESS (CMD): Status: ACTIVE | Noted: 2019-01-01

## 2019-03-21 PROBLEM — I77.0 AVF (ARTERIOVENOUS FISTULA) (CMD): Status: ACTIVE | Noted: 2019-01-01

## 2019-08-21 ENCOUNTER — HOSPITAL ENCOUNTER (OUTPATIENT)
Dept: CV DIAGNOSTICS | Facility: HOSPITAL | Age: 74
Discharge: HOME | End: 2019-08-21
Attending: NURSE PRACTITIONER
Payer: MEDICARE

## 2019-08-21 DIAGNOSIS — R06.02 SOB (SHORTNESS OF BREATH) ON EXERTION: ICD-10-CM

## 2019-08-21 DIAGNOSIS — I42.0 NONISCHEMIC DILATED CARDIOMYOPATHY (HCC): ICD-10-CM

## 2019-08-21 PROBLEM — N52.8 OTHER MALE ERECTILE DYSFUNCTION: Status: ACTIVE | Noted: 2019-01-01

## 2019-08-21 PROCEDURE — 93306 TTE W/DOPPLER COMPLETE: CPT | Performed by: NURSE PRACTITIONER

## 2019-11-14 ENCOUNTER — APPOINTMENT (OUTPATIENT)
Dept: GENERAL RADIOLOGY | Facility: HOSPITAL | Age: 74
End: 2019-11-14
Attending: EMERGENCY MEDICINE
Payer: MEDICARE

## 2019-11-14 ENCOUNTER — HOSPITAL ENCOUNTER (OUTPATIENT)
Facility: HOSPITAL | Age: 74
Setting detail: OBSERVATION
Discharge: HOME | End: 2019-11-15
Attending: EMERGENCY MEDICINE | Admitting: HOSPITALIST
Payer: MEDICARE

## 2019-11-14 DIAGNOSIS — I48.91 ATRIAL FIBRILLATION AND FLUTTER (HCC): Primary | ICD-10-CM

## 2019-11-14 DIAGNOSIS — E87.5 HYPERKALEMIA: ICD-10-CM

## 2019-11-14 DIAGNOSIS — Z99.2 ESRD (END STAGE RENAL DISEASE) ON DIALYSIS (HCC): ICD-10-CM

## 2019-11-14 DIAGNOSIS — I48.92 ATRIAL FIBRILLATION AND FLUTTER (HCC): Primary | ICD-10-CM

## 2019-11-14 DIAGNOSIS — N18.6 ESRD (END STAGE RENAL DISEASE) ON DIALYSIS (HCC): ICD-10-CM

## 2019-11-14 PROBLEM — R73.9 HYPERGLYCEMIA: Status: ACTIVE | Noted: 2019-11-14

## 2019-11-14 PROBLEM — D69.6 THROMBOCYTOPENIA (HCC): Status: ACTIVE | Noted: 2019-11-14

## 2019-11-14 PROBLEM — D64.9 ANEMIA: Status: ACTIVE | Noted: 2019-11-14

## 2019-11-14 PROCEDURE — 99220 INITIAL OBSERVATION CARE,LEVL III: CPT | Performed by: HOSPITALIST

## 2019-11-14 PROCEDURE — 99202 OFFICE O/P NEW SF 15 MIN: CPT | Performed by: INTERNAL MEDICINE

## 2019-11-14 PROCEDURE — 71045 X-RAY EXAM CHEST 1 VIEW: CPT | Performed by: EMERGENCY MEDICINE

## 2019-11-14 RX ORDER — SODIUM POLYSTYRENE SULFONATE 4.1 MEQ/G
15 POWDER, FOR SUSPENSION ORAL; RECTAL ONCE
Status: DISCONTINUED | OUTPATIENT
Start: 2019-11-14 | End: 2019-11-14

## 2019-11-14 RX ORDER — CALCITRIOL 0.25 UG/1
0.25 CAPSULE, LIQUID FILLED ORAL DAILY
Status: DISCONTINUED | OUTPATIENT
Start: 2019-11-14 | End: 2019-11-15

## 2019-11-14 RX ORDER — DEXTROSE MONOHYDRATE 25 G/50ML
50 INJECTION, SOLUTION INTRAVENOUS ONCE
Status: COMPLETED | OUTPATIENT
Start: 2019-11-14 | End: 2019-11-14

## 2019-11-14 RX ORDER — BUDESONIDE AND FORMOTEROL FUMARATE DIHYDRATE 160; 4.5 UG/1; UG/1
2 AEROSOL RESPIRATORY (INHALATION) 2 TIMES DAILY
COMMUNITY
Start: 2019-07-22

## 2019-11-14 RX ORDER — DILTIAZEM HYDROCHLORIDE 5 MG/ML
10 INJECTION INTRAVENOUS ONCE
Status: COMPLETED | OUTPATIENT
Start: 2019-11-14 | End: 2019-11-14

## 2019-11-14 RX ORDER — SEVELAMER CARBONATE 800 MG/1
2400 TABLET, FILM COATED ORAL
COMMUNITY

## 2019-11-14 RX ORDER — CALCITRIOL 0.25 UG/1
0.25 CAPSULE, LIQUID FILLED ORAL DAILY
COMMUNITY

## 2019-11-14 RX ORDER — WARFARIN SODIUM 2 MG/1
3 TABLET ORAL
Status: ON HOLD | COMMUNITY
End: 2019-12-13

## 2019-11-14 RX ORDER — ALFUZOSIN HYDROCHLORIDE 10 MG/1
10 TABLET, EXTENDED RELEASE ORAL
Status: DISCONTINUED | OUTPATIENT
Start: 2019-11-15 | End: 2019-11-15

## 2019-11-14 RX ORDER — METOPROLOL SUCCINATE 25 MG/1
12.5 TABLET, EXTENDED RELEASE ORAL DAILY
Status: ON HOLD | COMMUNITY
End: 2019-12-13

## 2019-11-14 RX ORDER — LIDOCAINE HYDROCHLORIDE 40 MG/ML
SOLUTION TOPICAL AS NEEDED
Status: DISCONTINUED | OUTPATIENT
Start: 2019-11-14 | End: 2019-11-15

## 2019-11-14 RX ORDER — GABAPENTIN 300 MG/1
300 CAPSULE ORAL 2 TIMES DAILY
Status: DISCONTINUED | OUTPATIENT
Start: 2019-11-14 | End: 2019-11-15

## 2019-11-14 RX ORDER — AMIODARONE HYDROCHLORIDE 100 MG/1
100 TABLET ORAL DAILY
Status: DISCONTINUED | OUTPATIENT
Start: 2019-11-15 | End: 2019-11-15

## 2019-11-14 RX ORDER — MELATONIN
100 DAILY
COMMUNITY

## 2019-11-14 RX ORDER — LIDOCAINE 40 MG/G
CREAM TOPICAL AS NEEDED
Status: ON HOLD | COMMUNITY
End: 2019-12-13

## 2019-11-14 RX ORDER — ALBUTEROL SULFATE 90 UG/1
2 AEROSOL, METERED RESPIRATORY (INHALATION) EVERY 6 HOURS PRN
Status: DISCONTINUED | OUTPATIENT
Start: 2019-11-14 | End: 2019-11-15

## 2019-11-14 RX ORDER — SEVELAMER CARBONATE 800 MG/1
2400 TABLET, FILM COATED ORAL
Status: DISCONTINUED | OUTPATIENT
Start: 2019-11-14 | End: 2019-11-15

## 2019-11-14 RX ORDER — ACETAMINOPHEN 325 MG/1
650 TABLET ORAL EVERY 6 HOURS PRN
Status: DISCONTINUED | OUTPATIENT
Start: 2019-11-14 | End: 2019-11-15

## 2019-11-14 RX ORDER — SILDENAFIL 100 MG/1
100 TABLET, FILM COATED ORAL
Status: ON HOLD | COMMUNITY
End: 2019-12-13

## 2019-11-14 RX ORDER — WARFARIN SODIUM 2 MG/1
2 TABLET ORAL
Status: COMPLETED | OUTPATIENT
Start: 2019-11-14 | End: 2019-11-14

## 2019-11-14 RX ORDER — ONDANSETRON 2 MG/ML
4 INJECTION INTRAMUSCULAR; INTRAVENOUS EVERY 6 HOURS PRN
Status: DISCONTINUED | OUTPATIENT
Start: 2019-11-14 | End: 2019-11-14

## 2019-11-14 RX ORDER — TRAMADOL HYDROCHLORIDE 50 MG/1
50 TABLET ORAL EVERY 6 HOURS PRN
Status: ON HOLD | COMMUNITY
End: 2019-12-13

## 2019-11-14 RX ORDER — UBIDECARENONE 10 MG
10 CAPSULE ORAL DAILY
Status: ON HOLD | COMMUNITY
End: 2019-12-13

## 2019-11-14 RX ORDER — METOPROLOL TARTRATE 5 MG/5ML
5 INJECTION INTRAVENOUS EVERY 6 HOURS
Status: DISCONTINUED | OUTPATIENT
Start: 2019-11-14 | End: 2019-11-14

## 2019-11-14 RX ORDER — IPRATROPIUM BROMIDE AND ALBUTEROL SULFATE 2.5; .5 MG/3ML; MG/3ML
3 SOLUTION RESPIRATORY (INHALATION) EVERY 6 HOURS PRN
Status: DISCONTINUED | OUTPATIENT
Start: 2019-11-14 | End: 2019-11-15

## 2019-11-14 RX ORDER — CHLORAL HYDRATE 500 MG
1000 CAPSULE ORAL DAILY
Status: ON HOLD | COMMUNITY
End: 2019-12-13

## 2019-11-14 RX ORDER — IPRATROPIUM BROMIDE AND ALBUTEROL SULFATE 2.5; .5 MG/3ML; MG/3ML
3 SOLUTION RESPIRATORY (INHALATION) EVERY 6 HOURS PRN
COMMUNITY
Start: 2019-07-22

## 2019-11-14 RX ORDER — METOPROLOL TARTRATE 5 MG/5ML
5 INJECTION INTRAVENOUS EVERY 6 HOURS PRN
Status: DISCONTINUED | OUTPATIENT
Start: 2019-11-14 | End: 2019-11-14

## 2019-11-14 RX ORDER — GABAPENTIN 300 MG/1
300 CAPSULE ORAL 2 TIMES DAILY
Status: ON HOLD | COMMUNITY
End: 2019-12-13

## 2019-11-14 RX ORDER — WARFARIN SODIUM 2 MG/1
2 TABLET ORAL SEE ADMIN INSTRUCTIONS
Status: ON HOLD | COMMUNITY
End: 2019-12-13

## 2019-11-14 RX ORDER — AMIODARONE HYDROCHLORIDE 200 MG/1
200 TABLET ORAL DAILY
Status: DISCONTINUED | OUTPATIENT
Start: 2019-11-14 | End: 2019-11-14

## 2019-11-14 RX ORDER — ASPIRIN 81 MG/1
81 TABLET ORAL DAILY
Status: DISCONTINUED | OUTPATIENT
Start: 2019-11-14 | End: 2019-11-15

## 2019-11-14 RX ORDER — METOPROLOL TARTRATE 5 MG/5ML
2.5 INJECTION INTRAVENOUS EVERY 6 HOURS PRN
Status: DISCONTINUED | OUTPATIENT
Start: 2019-11-14 | End: 2019-11-15

## 2019-11-14 NOTE — ED PROVIDER NOTES
Patient Seen in: [facility] Emergency Department      History   Patient presents with:  Arrythmia/Palpitations (cardiovascular)  Dyspnea NAPOLEON SOB (respiratory)    Stated Complaint: napoleon    HPI    53-year-old patient presents to the emergency departmen cannula       Current:/49 (BP Location: Right arm)   Pulse 76   Temp 98.6 °F (37 °C) (Oral)   Resp 17   Ht 188 cm (6' 2\")   Wt 74.8 kg   SpO2 97%   BMI 21.18 kg/m²         Physical Exam    77-year-old -American gentleman pale and chronically All other components within normal limits   POCT GLUCOSE - Abnormal; Notable for the following components:    POC Glucose 285 (*)     All other components within normal limits   POCT GLUCOSE - Abnormal; Notable for the following components:    POC Gluc right axilla. Sternotomy wires and surgical clips are noted. Prosthetic     heart valve identified         There is slight worsening cardiomegaly. There is vascular congestion     identified.   Mild increased interstitial markings could reflect     inter AM           Patient slept more into an atrial fibrillation after the Cardizem bolus, then started on a Cardizem bolus and drip, discussed the case with Dr. Rosanne Cabrales.   Patient admitted to the hospital also discussed the case with Dr. Tianna Edward for dialysis

## 2019-11-14 NOTE — CONSULTS
[facility] Cardiology  Report of Consultation    Oli Cardoza.  Patient Status:  Observation    1945 MRN [id number]   [facility] 7NE-A Attending Dejan Olea MD   Hosp Day # 0 PCP Natalee Avila     Reason for [facility] No real palpitations with tachycardia or syncope or near syncope. No acute cardiopulmonary symptoms. No chest pain. No new leg edema. No fever or chills. No acute GI symptoms. Patient was seen by his cardiologist last time in August 2019.   He is on o and showed no evidence of     dehiscence. There was no stenosis. Trivial regurgitation. Mean gradient     (S): 9mm Hg. VTI ratio of LVOT to aortic valve: 0.52.  3. Mitral valve: Structurally normal valve.  Mitral valve demonstrates normal     thickness leaf mg infusion, 10 mg, Intravenous, Q1H PRN  •  diltiazem 100mg/100ml in NaCl (CARDIZEM) 1 mg/mL premix/add-vantage, 2.5-20 mg/hr, Intravenous, Continuous  •  acetaminophen (TYLENOL) tab 650 mg, 650 mg, Oral, Q6H PRN  •  dextrose 50 % injection 50 mL, 50 mL, adenopathy. Normocephalic head. Cardiovascular: Tachycardia, irregularly irregular, crisp sounds of prosthetic aortic valve. Respiratory: Decreased air entry with few basal crackles. Gastrointestinal: Soft, non-tender abdomen.    Extremities: Without clu placement of an aortic stent graft compared to the previous exam.     Dictated by: Andrei Kelly MD on 11/14/2019 at 8:00     Approved by: Andrei Kelly MD on 11/14/2019 at 8:04            Labs:     Lab Results   Component Value Date    PT 20.4 ( -anticoagulation with Coumadin. 7. 7. Hyperglycemia. 8.  Hyperkalemia K5.6.  9.  End-stage renal disease on dialysis. 10.  COPD with history of smoking -on oxygen at home. 11.  Anemia of chronic disease.     The patient is followed byfollowed by [facility]

## 2019-11-14 NOTE — ED NOTES
Report given to Johan White rn at this time. Pt remains stable upon transfer, this rn is transporting pt upstairs.

## 2019-11-14 NOTE — ED INITIAL ASSESSMENT (HPI)
Pt presents to ed with increasing SOB and fast heart rate per dialysis nurse, pt denies any chest pain

## 2019-11-14 NOTE — CONSULTS
[facility]  Report of Consultation    Oli Kelley.  Patient Status:  Observation    1945 MRN [id number]   [facility] 7NE-A Attending Padmini Pelletier MD   Hosp Day # 0 PCP Gisell Shock     Reason for Consultation:  ESRD    His inhaler 2 puff, 2 puff, Inhalation, Q6H PRN  •  aspirin tab 81 mg, 81 mg, Oral, Daily  •  calciTRIOL (ROCALTROL) cap 0.25 mcg, 0.25 mcg, Oral, Daily  •  gabapentin (NEURONTIN) cap 300 mg, 300 mg, Oral, BID  •  ipratropium-albuterol (DUONEB) nebulizer solut edema.   Neurologic:  moving all extremities  Skin: Warm and dry, no rashes      Laboratory Data:  Lab Results   Component Value Date    WBC 6.8 11/14/2019    HGB 11.3 11/14/2019    HCT 34.7 11/14/2019    .0 11/14/2019    CREATSERUM 10.30 11/14/2019

## 2019-11-14 NOTE — H&P
[facility]  History and Physical     Oli Diogo Montanez.  Patient Status:  Emergency    1945 MRN [id number]   Location [address] [facility] Attending Luz Nguyen MD   Hosp Day # 0 DESIRE Camarena     Chief Complaint: Caro fluticasone-salmeterol (ADVAIR DISKUS) 500-50 MCG/DOSE Inhalation Aerosol Powder, Breath Activated, Inhale 1 puff into the lungs 2 (two) times daily. , Disp: , Rfl:   folic acid (FOLVITE) 1 MG Oral Tab, Take 1 mg by mouth daily. , Disp: , Rfl:   ten Moist mucous membranes. EOM-I. PERRLA. Anicteric. Neck: No lymphadenopathy. No JVD. No carotid bruits. Respiratory: Clear to auscultation bilaterally. No wheezes. No rhonchi. Cardiovascular: Irr Irr   Chest and Back: No tenderness or deformity.   Abdomen

## 2019-11-14 NOTE — PLAN OF CARE
NURSING ADMISSION NOTE      Patient admitted via Cart  Oriented to room. Safety precautions initiated. Bed in low position. Call light in reach.     Received patient A/Ox4, 2L, A fib on tele at 1030  Patient came to ED with potassium of 5.9, correcte vasoactive medications to optimize hemodynamic stability  - Monitor arterial and/or venous puncture sites for bleeding and/or hematoma  - Assess quality of pulses, skin color and temperature  - Assess for signs of decreased coronary artery perfusion - ex. and nutrition restrictions as appropriate  Outcome: Progressing     Problem: HEMATOLOGIC - ADULT  Goal: Maintains hematologic stability  Description  INTERVENTIONS  - Assess for signs and symptoms of bleeding or hemorrhage  - Monitor labs and vital signs f

## 2019-11-15 VITALS
TEMPERATURE: 98 F | SYSTOLIC BLOOD PRESSURE: 110 MMHG | OXYGEN SATURATION: 97 % | DIASTOLIC BLOOD PRESSURE: 48 MMHG | WEIGHT: 160.13 LBS | HEIGHT: 74 IN | HEART RATE: 68 BPM | RESPIRATION RATE: 15 BRPM | BODY MASS INDEX: 20.55 KG/M2

## 2019-11-15 PROCEDURE — 99213 OFFICE O/P EST LOW 20 MIN: CPT | Performed by: INTERNAL MEDICINE

## 2019-11-15 PROCEDURE — 99217 OBSERVATION CARE DISCHARGE: CPT | Performed by: HOSPITALIST

## 2019-11-15 NOTE — PROGRESS NOTES
[facility] Cardiology Progress Note    Subjective:  He feels good. Was aware last evening of rhythm c change.      Objective:  /53 (BP Location: Right arm)   Pulse 75   Temp 98 °F (36.7 °C) (Oral)   Resp 15   Ht 188 cm (6' 2\")   Wt 160 lb 1.6 oz (72.6 k

## 2019-11-15 NOTE — PLAN OF CARE
NURSING DISCHARGE NOTE    Discharged Home via Wheelchair. Accompanied by RN  Belongings Taken by patient/family.     Received patient A/Ox4, 3L, NSR on tele at 0700  3L oxygen is baseline for the patient   EKG completed to document patients conversion arrhythmias  - Monitor electrolytes and administer replacement therapy as ordered  Outcome: Adequate for Discharge     Problem: RESPIRATORY - ADULT  Goal: Achieves optimal ventilation and oxygenation  Description  INTERVENTIONS:  - Assess for changes in re and rectal medication administration  - Ensure safe mobilization of patient  - Hold pressure on venipuncture sites to achieve adequate hemostasis  - Assess for signs and symptoms of internal bleeding  - Monitor lab trends  - Patient is to report abnormal s

## 2019-11-15 NOTE — PLAN OF CARE
Assumed care 1900. Patient complaining of being SOB with ambulation. Patient on 3 L O2. Rescue inhaler given. Patient denies pain. Bruit and thrill upon fistula assessment. NSR on tele. Will continue to monitor.

## 2019-11-15 NOTE — PROGRESS NOTES
[facility]  Nephrology Progress Note    Oli Sanches.  Patient Status:  Observation    1945 MRN [id number]   [facility] 7NE-A Attending Roverto Conti MD   Hosp Day # 0 PCP VEE SCHUSTER       SUBJECTIVE:  Up in chair, no c/o 4 % external solution, , Topical, PRN  Albuterol Sulfate  (90 Base) MCG/ACT inhaler 2 puff, 2 puff, Inhalation, Q6H PRN  aspirin EC tab 81 mg, 81 mg, Oral, Daily  calciTRIOL (ROCALTROL) cap 0.25 mcg, 0.25 mcg, Oral, Daily  gabapentin (NEURONTIN) cap

## 2019-11-15 NOTE — PROGRESS NOTES
[facility]  Progress Note     Oli Kelley.  Patient Status:  Observation    1945 MRN [id number]   [facility] 7NE-A Attending Padmini Pelletier MD   Hosp Day # 0 PCP Gisell Shock     Chief Complaint: Afib    S: Patient in [employer] KRISHNA Sánchez Inhalation Daily   • Alfuzosin HCl ER  10 mg Oral Daily with breakfast   • metoprolol succinate  12.5 mg Oral 2x Daily(Beta Blocker)       ASSESSMENT / PLAN:     1. Afib:  Now in SR  2. Elevated troponin:  No evidence of an ACS, likely due to ESRD  3.  HTN

## 2019-11-16 NOTE — DISCHARGE SUMMARY
[facility]  DISCHARGE SUMMARY     Oli Sal.  Patient Status:  Observation    1945 MRN [id number]   [facility] 7NE-A Attending No att. providers found   Hosp Day # 0 PCP Karen Saravia     Date of Admission: 2019  D 0     gabapentin 300 MG Caps  Commonly known as:  NEURONTIN      Take 300 mg by mouth 2 (two) times daily.    Refills:  0     ipratropium-albuterol 0.5-2.5 (3) MG/3ML Soln  Commonly known as:  DUONEB      Take 3 mL by nebulization every 6 (six) hours as ne 12/16/2019    None          Vital signs:  Temp:  [98.1 °F (36.7 °C)] 98.1 °F (36.7 °C)  Pulse:  [68] 68  Resp:  [15] 15  BP: (110)/(48) 110/48    Physical Exam:    General: No acute distress. Respiratory: Clear to auscultation bilaterally. No wheezes.  No

## 2019-11-28 ENCOUNTER — HOSPITAL ENCOUNTER (EMERGENCY)
Facility: HOSPITAL | Age: 74
Discharge: HOME | End: 2019-11-28
Attending: EMERGENCY MEDICINE
Payer: MEDICARE

## 2019-11-28 ENCOUNTER — APPOINTMENT (OUTPATIENT)
Dept: CT IMAGING | Facility: HOSPITAL | Age: 74
End: 2019-11-28
Attending: EMERGENCY MEDICINE
Payer: MEDICARE

## 2019-11-28 VITALS
TEMPERATURE: 98 F | WEIGHT: 160.06 LBS | HEART RATE: 95 BPM | OXYGEN SATURATION: 96 % | DIASTOLIC BLOOD PRESSURE: 61 MMHG | SYSTOLIC BLOOD PRESSURE: 107 MMHG | RESPIRATION RATE: 18 BRPM | BODY MASS INDEX: 21 KG/M2

## 2019-11-28 DIAGNOSIS — N28.1 RENAL CYST: ICD-10-CM

## 2019-11-28 DIAGNOSIS — S39.012A STRAIN OF LUMBAR REGION, INITIAL ENCOUNTER: Primary | ICD-10-CM

## 2019-11-28 DIAGNOSIS — N18.6 ESRD (END STAGE RENAL DISEASE) (HCC): ICD-10-CM

## 2019-11-28 PROCEDURE — 85730 THROMBOPLASTIN TIME PARTIAL: CPT | Performed by: EMERGENCY MEDICINE

## 2019-11-28 PROCEDURE — 85610 PROTHROMBIN TIME: CPT | Performed by: EMERGENCY MEDICINE

## 2019-11-28 PROCEDURE — 99285 EMERGENCY DEPT VISIT HI MDM: CPT

## 2019-11-28 PROCEDURE — 96374 THER/PROPH/DIAG INJ IV PUSH: CPT

## 2019-11-28 PROCEDURE — 80053 COMPREHEN METABOLIC PANEL: CPT | Performed by: EMERGENCY MEDICINE

## 2019-11-28 PROCEDURE — 99284 EMERGENCY DEPT VISIT MOD MDM: CPT

## 2019-11-28 PROCEDURE — 96375 TX/PRO/DX INJ NEW DRUG ADDON: CPT

## 2019-11-28 PROCEDURE — 85025 COMPLETE CBC W/AUTO DIFF WBC: CPT | Performed by: EMERGENCY MEDICINE

## 2019-11-28 PROCEDURE — 74176 CT ABD & PELVIS W/O CONTRAST: CPT | Performed by: EMERGENCY MEDICINE

## 2019-11-28 RX ORDER — HYDROCODONE BITARTRATE AND ACETAMINOPHEN 5; 325 MG/1; MG/1
1 TABLET ORAL EVERY 8 HOURS PRN
Qty: 10 TABLET | Refills: 0 | Status: ON HOLD | OUTPATIENT
Start: 2019-11-28 | End: 2019-12-13

## 2019-11-28 RX ORDER — ONDANSETRON 2 MG/ML
4 INJECTION INTRAMUSCULAR; INTRAVENOUS ONCE
Status: COMPLETED | OUTPATIENT
Start: 2019-11-28 | End: 2019-11-28

## 2019-11-28 RX ORDER — MORPHINE SULFATE 4 MG/ML
4 INJECTION, SOLUTION INTRAMUSCULAR; INTRAVENOUS EVERY 30 MIN PRN
Status: DISCONTINUED | OUTPATIENT
Start: 2019-11-28 | End: 2019-11-28

## 2019-11-28 NOTE — ED PROVIDER NOTES
Patient Seen in: [facility] Emergency Department      History   Patient presents with:  Back Pain (musculoskeletal)    Stated Complaint: L sided low back pain     HPI    Patient is a 74-year-old male with multiple past medical problems, including at sided low back pain   Other systems are as noted in HPI. Constitutional and vital signs reviewed. All other systems reviewed and negative except as noted above.     Physical Exam     ED Triage Vitals   BP 11/28/19 0211 104/72   Pulse 11/28/19 0118 104 nontender throughout, and normal active range of motion of all 4 extremities. Distal pulses normal and symmetric  Skin: No masses or nodules or abnormalities.   Psych: Normal interaction, cooperative with exam         ED Course     Labs Reviewed   COMP MET abdomen pelvis without contrast    IMPRESSION:    No acute intra-abdominal pathology. New complex right lower pole renal mass like lesion, measuring approximately 2.8 cm with associated calcifications. Consider nonemergent follow-up renal ultrasound. am    Follow-up:  Giovana Tan  [address] [facility] [address]  [facility]  [phone]    Call in 1 day          Medications Prescribed:  Current Discharge Medication List    START taking these medications    HYDROcodone-acetaminophen 5-325 MG Oral Ta

## 2019-11-30 ENCOUNTER — HOSPITAL ENCOUNTER (INPATIENT)
Facility: HOSPITAL | Age: 74
LOS: 13 days | Discharge: NURSING, REHAB OR LONG-TERM CARE | DRG: 393 | End: 2019-12-13
Attending: EMERGENCY MEDICINE | Admitting: HOSPITALIST
Payer: MEDICARE

## 2019-11-30 ENCOUNTER — APPOINTMENT (OUTPATIENT)
Dept: CT IMAGING | Facility: HOSPITAL | Age: 74
DRG: 393 | End: 2019-11-30
Attending: EMERGENCY MEDICINE
Payer: MEDICARE

## 2019-11-30 DIAGNOSIS — E16.2 HYPOGLYCEMIA: ICD-10-CM

## 2019-11-30 DIAGNOSIS — E87.5 HYPERKALEMIA: Primary | ICD-10-CM

## 2019-11-30 DIAGNOSIS — N18.6 ESRD (END STAGE RENAL DISEASE) (HCC): ICD-10-CM

## 2019-11-30 DIAGNOSIS — K52.9 ACUTE COLITIS: ICD-10-CM

## 2019-11-30 DIAGNOSIS — I48.20 ATRIAL FIBRILLATION, CHRONIC (HCC): ICD-10-CM

## 2019-11-30 PROBLEM — E87.1 HYPONATREMIA: Status: ACTIVE | Noted: 2019-11-30

## 2019-11-30 PROBLEM — N17.9 ACUTE KIDNEY INJURY (HCC): Status: ACTIVE | Noted: 2019-11-30

## 2019-11-30 PROBLEM — N17.9 ACUTE RENAL FAILURE (ARF) (HCC): Status: ACTIVE | Noted: 2019-11-30

## 2019-11-30 PROCEDURE — 99223 1ST HOSP IP/OBS HIGH 75: CPT | Performed by: HOSPITALIST

## 2019-11-30 PROCEDURE — 74176 CT ABD & PELVIS W/O CONTRAST: CPT | Performed by: EMERGENCY MEDICINE

## 2019-11-30 PROCEDURE — 5A1D70Z PERFORMANCE OF URINARY FILTRATION, INTERMITTENT, LESS THAN 6 HOURS PER DAY: ICD-10-PCS | Performed by: INTERNAL MEDICINE

## 2019-11-30 RX ORDER — SODIUM CHLORIDE 9 MG/ML
1000 INJECTION, SOLUTION INTRAVENOUS ONCE
Status: COMPLETED | OUTPATIENT
Start: 2019-11-30 | End: 2019-11-30

## 2019-11-30 RX ORDER — ONDANSETRON 2 MG/ML
4 INJECTION INTRAMUSCULAR; INTRAVENOUS EVERY 4 HOURS PRN
Status: DISCONTINUED | OUTPATIENT
Start: 2019-11-30 | End: 2019-12-13

## 2019-11-30 RX ORDER — HYDROMORPHONE HYDROCHLORIDE 1 MG/ML
0.5 INJECTION, SOLUTION INTRAMUSCULAR; INTRAVENOUS; SUBCUTANEOUS EVERY 30 MIN PRN
Status: ACTIVE | OUTPATIENT
Start: 2019-11-30 | End: 2019-12-01

## 2019-11-30 RX ORDER — DEXTROSE MONOHYDRATE 25 G/50ML
50 INJECTION, SOLUTION INTRAVENOUS ONCE
Status: COMPLETED | OUTPATIENT
Start: 2019-11-30 | End: 2019-11-30

## 2019-11-30 RX ORDER — HYDROMORPHONE HYDROCHLORIDE 1 MG/ML
0.5 INJECTION, SOLUTION INTRAMUSCULAR; INTRAVENOUS; SUBCUTANEOUS EVERY 30 MIN PRN
Status: DISCONTINUED | OUTPATIENT
Start: 2019-11-30 | End: 2019-11-30

## 2019-12-01 ENCOUNTER — APPOINTMENT (OUTPATIENT)
Dept: GENERAL RADIOLOGY | Facility: HOSPITAL | Age: 74
DRG: 393 | End: 2019-12-01
Attending: STUDENT IN AN ORGANIZED HEALTH CARE EDUCATION/TRAINING PROGRAM
Payer: MEDICARE

## 2019-12-01 ENCOUNTER — APPOINTMENT (OUTPATIENT)
Dept: CT IMAGING | Facility: HOSPITAL | Age: 74
DRG: 393 | End: 2019-12-01
Attending: HOSPITALIST
Payer: MEDICARE

## 2019-12-01 ENCOUNTER — APPOINTMENT (OUTPATIENT)
Dept: ULTRASOUND IMAGING | Facility: HOSPITAL | Age: 74
DRG: 393 | End: 2019-12-01
Attending: STUDENT IN AN ORGANIZED HEALTH CARE EDUCATION/TRAINING PROGRAM
Payer: MEDICARE

## 2019-12-01 PROCEDURE — 99233 SBSQ HOSP IP/OBS HIGH 50: CPT | Performed by: STUDENT IN AN ORGANIZED HEALTH CARE EDUCATION/TRAINING PROGRAM

## 2019-12-01 PROCEDURE — 70450 CT HEAD/BRAIN W/O DYE: CPT | Performed by: HOSPITALIST

## 2019-12-01 PROCEDURE — 71045 X-RAY EXAM CHEST 1 VIEW: CPT | Performed by: STUDENT IN AN ORGANIZED HEALTH CARE EDUCATION/TRAINING PROGRAM

## 2019-12-01 PROCEDURE — 76700 US EXAM ABDOM COMPLETE: CPT | Performed by: STUDENT IN AN ORGANIZED HEALTH CARE EDUCATION/TRAINING PROGRAM

## 2019-12-01 PROCEDURE — 99223 1ST HOSP IP/OBS HIGH 75: CPT | Performed by: INTERNAL MEDICINE

## 2019-12-01 RX ORDER — GABAPENTIN 300 MG/1
300 CAPSULE ORAL 2 TIMES DAILY
Status: DISCONTINUED | OUTPATIENT
Start: 2019-12-01 | End: 2019-12-01

## 2019-12-01 RX ORDER — FOLIC ACID 1 MG/1
1 TABLET ORAL DAILY
Status: DISCONTINUED | OUTPATIENT
Start: 2019-12-01 | End: 2019-12-13

## 2019-12-01 RX ORDER — DILTIAZEM HYDROCHLORIDE 5 MG/ML
INJECTION INTRAVENOUS
Status: DISPENSED
Start: 2019-12-01 | End: 2019-12-01

## 2019-12-01 RX ORDER — ALFUZOSIN HYDROCHLORIDE 10 MG/1
10 TABLET, EXTENDED RELEASE ORAL
Status: DISCONTINUED | OUTPATIENT
Start: 2019-12-01 | End: 2019-12-04

## 2019-12-01 RX ORDER — CALCITRIOL 0.25 UG/1
0.25 CAPSULE, LIQUID FILLED ORAL DAILY
Status: DISCONTINUED | OUTPATIENT
Start: 2019-12-01 | End: 2019-12-13

## 2019-12-01 RX ORDER — ASPIRIN 81 MG/1
81 TABLET, CHEWABLE ORAL DAILY
Status: DISCONTINUED | OUTPATIENT
Start: 2019-12-01 | End: 2019-12-13

## 2019-12-01 RX ORDER — METOPROLOL SUCCINATE 25 MG/1
25 TABLET, EXTENDED RELEASE ORAL
Status: DISCONTINUED | OUTPATIENT
Start: 2019-12-02 | End: 2019-12-02

## 2019-12-01 RX ORDER — TRAMADOL HYDROCHLORIDE 50 MG/1
50 TABLET ORAL EVERY 12 HOURS PRN
Status: DISCONTINUED | OUTPATIENT
Start: 2019-12-01 | End: 2019-12-01

## 2019-12-01 RX ORDER — IPRATROPIUM BROMIDE AND ALBUTEROL SULFATE 2.5; .5 MG/3ML; MG/3ML
3 SOLUTION RESPIRATORY (INHALATION) EVERY 6 HOURS PRN
Status: DISCONTINUED | OUTPATIENT
Start: 2019-12-01 | End: 2019-12-13

## 2019-12-01 RX ORDER — TRAMADOL HYDROCHLORIDE 50 MG/1
50 TABLET ORAL EVERY 6 HOURS PRN
Status: DISCONTINUED | OUTPATIENT
Start: 2019-12-01 | End: 2019-12-01

## 2019-12-01 RX ORDER — DILTIAZEM HYDROCHLORIDE 5 MG/ML
10 INJECTION INTRAVENOUS ONCE
Status: COMPLETED | OUTPATIENT
Start: 2019-12-01 | End: 2019-12-01

## 2019-12-01 RX ORDER — AMIODARONE HYDROCHLORIDE 100 MG/1
100 TABLET ORAL DAILY
Status: DISCONTINUED | OUTPATIENT
Start: 2019-12-01 | End: 2019-12-13

## 2019-12-01 RX ORDER — SEVELAMER CARBONATE 800 MG/1
2400 TABLET, FILM COATED ORAL
Status: DISCONTINUED | OUTPATIENT
Start: 2019-12-01 | End: 2019-12-13

## 2019-12-01 RX ORDER — ACETAMINOPHEN 325 MG/1
650 TABLET ORAL EVERY 6 HOURS PRN
Status: DISCONTINUED | OUTPATIENT
Start: 2019-12-01 | End: 2019-12-13

## 2019-12-01 RX ORDER — DEXTROSE MONOHYDRATE 100 MG/ML
INJECTION, SOLUTION INTRAVENOUS CONTINUOUS
Status: DISCONTINUED | OUTPATIENT
Start: 2019-12-01 | End: 2019-12-11

## 2019-12-01 RX ORDER — WARFARIN SODIUM 2 MG/1
2 TABLET ORAL
Status: DISCONTINUED | OUTPATIENT
Start: 2019-12-01 | End: 2019-12-01

## 2019-12-01 RX ORDER — MELATONIN
100 DAILY
Status: DISCONTINUED | OUTPATIENT
Start: 2019-12-01 | End: 2019-12-13

## 2019-12-01 RX ORDER — ALBUTEROL SULFATE 90 UG/1
2 AEROSOL, METERED RESPIRATORY (INHALATION) EVERY 6 HOURS PRN
Status: DISCONTINUED | OUTPATIENT
Start: 2019-12-01 | End: 2019-12-13

## 2019-12-01 RX ORDER — SODIUM CHLORIDE 9 MG/ML
INJECTION, SOLUTION INTRAVENOUS CONTINUOUS
Status: DISCONTINUED | OUTPATIENT
Start: 2019-12-01 | End: 2019-12-02

## 2019-12-01 RX ORDER — HYDROCODONE BITARTRATE AND ACETAMINOPHEN 5; 325 MG/1; MG/1
1 TABLET ORAL EVERY 8 HOURS PRN
Status: DISCONTINUED | OUTPATIENT
Start: 2019-12-01 | End: 2019-12-01

## 2019-12-01 NOTE — DIETARY NOTE
[facility]   CLINICAL NUTRITION    [facility].      Admitting diagnosis:  Hyperkalemia [E87.5]  Hypoglycemia [E16.2]  ESRD (end stage renal disease) ([address] [N18.6]  Acute colitis [K52.9]  Atrial fibrillation, chronic [I48.20]    Ht:  188 cm  Wt: 7

## 2019-12-01 NOTE — ED PROVIDER NOTES
Patient Seen in: [facility] Emergency Department      History   Patient presents with:  Fatigue (constitutional, neurologic)    Stated Complaint: weakness over last few days    HPI    72-year-old male who presents here to the emergency department co HPI.  Constitutional and vital signs reviewed. All other systems reviewed and negative except as noted above.     Physical Exam     ED Triage Vitals   BP 11/30/19 1815 117/64   Pulse 11/30/19 1756 (P) 105   Resp 11/30/19 1756 (P) 24   Temp 11/30/19 180 Ratio 0.8 (*)     All other components within normal limits   POCT GLUCOSE - Abnormal; Notable for the following components:    POC Glucose 63 (*)     All other components within normal limits   CBC W/ DIFFERENTIAL - Abnormal; Notable for the following com Meg Ham MD on 11/30/2019 at 19:35     Approved by: Kelsey Meraz MD on 11/30/2019 at 19:44                  MDM   The patient's family did mention that he had had some loose stools the patient was not reporting.   Repeat glucose was up to 161 sodium 1 List                   Present on Admission           ICD-10-CM Noted POA    Acute kidney injury ([facility] [address] N17.9 11/30/2019 Yes    Acute renal failure (ARF) ([facility] [address] N17.9 11/30/2019 Yes    Hyperkalemia E87.5 11/14/2019 Unknown    Hyponatremia E87.1 11/30/2019 Silvano Haywood

## 2019-12-01 NOTE — PROGRESS NOTES
[facility]  Progress Note     Oli Gonzalez.  Patient Status:  Inpatient    1945 MRN [id number]   [facility] 8NE-A Attending Silvia Villalobos MD   Hosp Day # 1 PCP Michelle Gary     Chief Complaint: lethargy    S: Patient fati in the last 168 hours. Imaging: Imaging data reviewed in Epic.     Medications:   • amiodarone HCl  100 mg Oral Daily   • aspirin  81 mg Oral Daily   • calciTRIOL  0.25 mcg Oral Daily   • folic acid  1 mg Oral Daily   • gabapentin  300 mg Oral BID

## 2019-12-01 NOTE — PLAN OF CARE
Contact precautions for rule out cdiff- pending stool sample  Sedated from Dilaudid in ED  2 liters oxygen, - no on oxygen baseline  Seizure precautions initiated  PT ordered per protocol- per family patient uses walker and cane to get around  [employer]

## 2019-12-01 NOTE — PHYSICAL THERAPY NOTE
PT order received and chart reviewed. Spoke to nursing, pt is not approp for skilled PT intervention today. Will follow up tomorrow.

## 2019-12-01 NOTE — PLAN OF CARE
Aox3, drowsy, 96 % on 3L, afib on tele, HR 90's, cardizem 10mg /hr, no complaints of SOB or chest pain, VSS, IV zosyn, dextrose 10% 20 mL/hr, recheck blood sugar this afternoon 119, aspirations precautions, US abdomen completed,safety precautions in place; measures for life threatening arrhythmias  - Monitor electrolytes and administer replacement therapy as ordered  Outcome: Progressing     Problem: PAIN - ADULT  Goal: Verbalizes/displays adequate comfort level or patient's stated pain goal  Description  IN

## 2019-12-01 NOTE — PROGRESS NOTES
[facility] Cardiology Progress Note    Patient seen and examined.  Chart reviewed. He is a 70 yo with complex cardiac hx that was concisely summarized by my partner Dr. Wicho Valadez last month.   We were asked to see him for afib with RVR, which is the same reason for evidence of     dehiscence. There was no stenosis. Trivial regurgitation. Mean gradient     (S): 9mm Hg. VTI ratio of LVOT to aortic valve: 0.52.  3. Mitral valve: Structurally normal valve. Mitral valve demonstrates normal     thickness leaflets.  Maria [place] per day on Sun Mon Wed Fri  Warfarin Sodium (COUMADIN) tab 3 mg, 3 mg, Oral, Once per day on Tue Thu Sat  traMADol HCl (ULTRAM) tab 50 mg, 50 mg, Oral, Q6H PRN  Thiamine HCl tab 100 mg, 100 mg, Oral, Daily  Alfuzosin HCl ER (UROXATRAL) 24 hr tab 10 mg, 10 thoracoabdominal aortic aneurysm and hx of repaired type A dissection in 2003, redo repair in 2015   - on Coumadin with INR therapeutic 3.15 last night; will give only 2 mg of Coumadin tonight  4.  ESRD with hyperkalemia - renal service following  5.  COPD

## 2019-12-01 NOTE — PLAN OF CARE
orders for dialysis  ER didn't call for emergent dialysis per RN \"we don't do that\"  Patient too sedated from dilaudid to complete admission navigator or answer questions  Family asked admission questions  's afib    Dr. Jens Marquez paged  Home med list t

## 2019-12-01 NOTE — PROGRESS NOTES
Pharmacy Note:  Renal Dose Adjustment of tramadol (ULTRAM)         Oli Ravi. is a 69year old male who has been prescribed tramadol 50mg q6hr prn.       Est CrCl: on HD T/Th/Sat    The dose has been adjusted to tramadol 50mg q12hr  prn per hospital

## 2019-12-01 NOTE — H&P
[facility]  History and Physical     Oli [facility].  Patient Status:  Inpatient    1945 MRN [id number]   [facility] 8NE-A Attending Josephine Ramirez MD   Hosp Day # 1 PCP Lex Marie     Chief Complaint: Weakness, hypoglyc medications on file prior to encounter. HYDROcodone-acetaminophen 5-325 MG Oral Tab, Take 1 tablet by mouth every 8 (eight) hours as needed for Pain (Do not drive while on his this medication. It will make you sleepy.   Do not take with other Tylenol cont Disp: , Rfl:   folic acid (FOLVITE) 1 MG Oral Tab, Take 1 mg by mouth daily. , Disp: , Rfl:   Amiodarone HCl (PACERONE) 200 MG Oral Tab, Take 100 mg by mouth daily. , Disp: , Rfl:   aspirin 81 MG Oral Tab, Take 81 mg by mouth daily. , Disp: , Rfl:         R ordered  2. Empiric abx  3. NPO, advance as tolerated  3. Hyperkalemia, urgent HD tonight  4. ESRD on HD, resume tonight, renal eval  5. Possible renal mass, needs biopsy, urology to eval  6. Hypoglycemia, improved  7. Hyponatremia, mild, monitor  8.  HTN,

## 2019-12-01 NOTE — CONSULTS
[facility]  Report of Consultation    Oli Author Kory.  Patient Status:  Inpatient    1945 MRN [id number]   [facility] 8NE-A Attending Julian Herrera MD   Hosp Day # 1 PCP VEE SCHUSTER       Assessment / Plan:    1) ESRD- presum Unspecified essential hypertension      History reviewed. No pertinent surgical history. No family history on file. Denies family history of kidney disease. reports that he has quit smoking.  He has never used smokeless tobacco.    Allergies:  No Known HD) high dose for 65 yrs & older inj 0.5ml, 0.5 mL, Intramuscular, Prior to discharge  No current outpatient medications on file. Review of Systems:  Please see HPI for pertinent positives. 10 point review of systems otherwise reviewed and negative.

## 2019-12-02 PROCEDURE — 99291 CRITICAL CARE FIRST HOUR: CPT | Performed by: NURSE PRACTITIONER

## 2019-12-02 PROCEDURE — 99232 SBSQ HOSP IP/OBS MODERATE 35: CPT | Performed by: INTERNAL MEDICINE

## 2019-12-02 PROCEDURE — 99233 SBSQ HOSP IP/OBS HIGH 50: CPT | Performed by: STUDENT IN AN ORGANIZED HEALTH CARE EDUCATION/TRAINING PROGRAM

## 2019-12-02 RX ORDER — ALBUMIN (HUMAN) 12.5 G/50ML
100 SOLUTION INTRAVENOUS AS NEEDED
Status: DISCONTINUED | OUTPATIENT
Start: 2019-12-02 | End: 2019-12-04

## 2019-12-02 RX ORDER — ALBUMIN (HUMAN) 12.5 G/50ML
100 SOLUTION INTRAVENOUS ONCE
Status: COMPLETED | OUTPATIENT
Start: 2019-12-02 | End: 2019-12-02

## 2019-12-02 NOTE — PLAN OF CARE
12/1/2019    Pt more alert than day shift with no other changes since shift report. Contact Iso d/c'd per protocol. Met all pt needs. Pt resting in bed.  Will cont to monitor    Problem: Delirium  Goal: Minimize duration of delirium  Description  [place] on type and severity of pain and evaluate response  - Implement non-pharmacological measures as appropriate and evaluate response  - Consider cultural and social influences on pain and pain management  - Manage/alleviate anxiety  - Utilize distraction and/

## 2019-12-02 NOTE — PROGRESS NOTES
[facility]  Cardiology Progress Note    Olitee Argueta Allie. Patient Status:  Inpatient    1945 MRN [id number]   [facility] 8NE-A Attending Queta Fall MD   Hosp Day # 2 PCP Gisell Shock     Subjective:  Tired.  Denies chest pain Thiamine HCl  100 mg Oral Daily   • Alfuzosin HCl ER  10 mg Oral Daily with breakfast   • Fluticasone Furoate-Vilanterol  1 puff Inhalation Daily   • Sevelamer Carbonate  2,400 mg Oral TID CC   • piperacillin-tazobactam  3.375 g Intravenous Q12H   • Metopr

## 2019-12-02 NOTE — PROGRESS NOTES
[facility]  Nephrology Progress Note    Oli Singh.  Patient Status:  Inpatient    1945 MRN [id number]   [facility] 8NE-A Attending Lamar Rios MD   Hosp Day # 2 PCP VEE SCHUSTER       SUBJECTIVE:  Awake, confused, no ac 11/28/19  0132 11/30/19  1901 12/02/19  0526   ALT 31 59 56   AST 43* 119* 66*   ALB 3.5 3.2* 2.7*       Recent Labs   Lab 11/30/19  1756 11/30/19  1834 12/01/19  0008 12/01/19  0640 12/01/19  1358   PGLU 63* 89 118* 81 119*       Meds:   Albuterol Sulfate

## 2019-12-02 NOTE — PLAN OF CARE
Aox3, more alert than yesterday and able to hold small conversation in am,  96% on 3L, VSS, no complaints of chest pain or SOB, poor appetite, will advance diet to full liquid, IV zosyn given, IV dextrose infusing, afib on tele, HR 90's IV cardizem weaned edema, trend weights  Outcome: Progressing  Goal: Absence of cardiac arrhythmias or at baseline  Description  INTERVENTIONS:  - Continuous cardiac monitoring, monitor vital signs, obtain 12 lead EKG if indicated  - Evaluate effectiveness of antiarrhythmic

## 2019-12-02 NOTE — PROGRESS NOTES
[facility]  Progress Note     Oli Sanhces.  Patient Status:  Inpatient    1945 MRN [id number]   [facility] 8NE-A Attending Chris Milligan MD   Hosp Day # 2 PCP Tess Anderson     Chief Complaint: lethargy    S: Patient more mL/min (A) (based on SCr of 8.43 mg/dL (H)). Recent Labs   Lab 11/30/19  1806 12/01/19  1249 12/02/19  0526   PTP 34.6* 42.6* 48.2*   INR 3.15* 4.07* 4.74*       No results for input(s): TROP, CK in the last 168 hours.          Imaging: Imaging data revi

## 2019-12-02 NOTE — PROGRESS NOTES
Patient laying in bed, alert and oriented to place only, confused, lethargic, drowsy, unable to follow commands. Dextrose 10% infusing per order. AFIB on cardiac monitor with HR's 80's-90's. BP low, bolus given per order.  Left AV fistula, left limb alert b

## 2019-12-02 NOTE — PHYSICAL THERAPY NOTE
PT orders received. PT eval attempted but pt very lethargic and not following commands. RN aware of attempt. PT will attempt again when pt is more appropriate for therapy. Will continue to follow.

## 2019-12-03 PROCEDURE — 90935 HEMODIALYSIS ONE EVALUATION: CPT | Performed by: INTERNAL MEDICINE

## 2019-12-03 PROCEDURE — 99233 SBSQ HOSP IP/OBS HIGH 50: CPT | Performed by: STUDENT IN AN ORGANIZED HEALTH CARE EDUCATION/TRAINING PROGRAM

## 2019-12-03 RX ORDER — LIDOCAINE AND PRILOCAINE 25; 25 MG/G; MG/G
CREAM TOPICAL ONCE
Status: COMPLETED | OUTPATIENT
Start: 2019-12-03 | End: 2019-12-03

## 2019-12-03 NOTE — PHYSICAL THERAPY NOTE
Order received for PT eval 11/30/19 per [facility] protocol. Pt not appropriate for PT eval 12/1 and 12/2. Pt then transferred to ICU 12/2 with hypotension and lethargy. Pt with current INR 5.73. Pt remains inappropriate for skilled therapy.  Will place patient

## 2019-12-03 NOTE — PROGRESS NOTES
[facility]  Progress Note     Oli Sevilla.  Patient Status:  Inpatient    1945 MRN [id number]   [facility] [place] 8NE-A Attending Nessa Simmons MD   Hosp Day # 3 PCP Lj Flood     Chief Complaint: lethargy    S: Patient more 119*  --  66*  --    ALT 31 59  --  56  --    BILT 1.1 1.4  --  1.0  --    TP 7.4 7.4  --  6.2*  --        Estimated Creatinine Clearance: 6.9 mL/min (A) (based on SCr of 9.79 mg/dL (H)).     Recent Labs   Lab 12/01/19  1249 12/02/19  0526 12/03/19  1230 Daniel Henderson-  left.      Setven Rush MD

## 2019-12-03 NOTE — CONSULTS
[address] [place] [address].  Patient Status:  Inpatient    1945 MRN [id number]   [facility] 4SW-A Attending Chandra Ruvalcaba MD   [facility] Day # 3 DESIRE Landa     Date of Admission: 2019  Admission Diagnosis: Hyperkalemi will make you sleepy. Do not take with other Tylenol containing products. )., Disp: 10 tablet, Rfl: 0  Sevelamer Carbonate 800 MG Oral Tab, Take 2,400 mg by mouth 3 (three) times daily with meals. , Disp: , Rfl:   SYMBICORT 160-4.5 MCG/ACT Inhalation Aeroso Oral Tab, Take 81 mg by mouth daily. , Disp: , Rfl:          Current Medications:    Current Facility-Administered Medications:   •  Albumin Human (ALBUMINAR) 25 % solution 100 mL, 100 mL, Intravenous, PRN  •  metoprolol succinate (Toprol XL) partial tablet orthopnea, or syncope  Respiratory: denies SOB, dyspnea on exertion, denies cough, hemoptysis, wheezing, pleurisy  GI: denies nausea, vomiting, diarrhea, constipation, melena, abdominal pain  : denies hematuria, dysuria, hesitancy, or incontinence  Muscu MCV 92.3 12/03/2019    MCH 31.3 12/03/2019    MCHC 33.9 12/03/2019    RDW 17.6 12/03/2019    .0 12/03/2019     Lab Results   Component Value Date     12/03/2019    K 5.7 12/03/2019    CL 96 12/03/2019    CO2 25.0 12/03/2019    BUN 82 12/03/201

## 2019-12-03 NOTE — PROGRESS NOTES
ICU  Critical Care APRN Progress Note    NAME: Christiana Ferrari. - ROOM: [address] [place] [address] - MRN: [id number] - Age: 69year old - :1945    History Of Present Illness:  Oli Burnette. is a 69year old male with PMHx significant for avr, afib, on coumadin, Neck: No JVD, neck supple, no adenopathy, trachea midline, no carotid bruits  Lungs: Clear to auscultation bilaterally, respirations unlabored  Heart: afib , S1 and S2 normal, no murmur, rub or gallop  Abdomen: Soft, non-tender, bowel sounds active a CONCLUSION:  1. Development of moderate circumferential wall thickening involving the distal transverse colon, left colon and sigmoid colon milder narrowing of the rectum. Findings suggest colitis. Other etiologies less likely.  2. Cholelithiasis with sli CONCLUSION:  Cardiomegaly with pulmonary venous congestion. Stable aneurysmal dilatation of the thoracic aorta. Diffuse bilateral pulmonary infiltrates versus edema without significant change from 11/14/2019.   Postsurgical changes of median sternotomy an -SCD    Dispo:   -Full code    Plan of care discussed with intensivist on-call, Dr. Man Gomez.           SILVIA Barrientos  Critical Care Nurse Practitioner  Spectra # [phone]        A total of 35 minutes of critical care time (exclusive of billable procedures)

## 2019-12-03 NOTE — PROGRESS NOTES
[facility]  Nephrology Progress Note    Oli Sanches.  Patient Status:  Inpatient    1945 MRN [id number]   [facility] 8NE-A Attending Chris Milligan MD   Hosp Day # 3 PCP VEE SCHUSTER       SUBJECTIVE:  Events noted, pt seen 102* 47* 71* 82*   CREATSERUM 8.99* 11.20* 6.49* 8.43* 9.79*   CA 8.6 8.2* 8.5 8.2* 8.3*   * 161* 80 271* 124*       Recent Labs   Lab 11/28/19  0132 11/30/19  1901 12/02/19  0526 12/03/19  0433   ALT 31 59 56  --    AST 43* 119* 66*  --    ALB 3.5 discharge          Impression/Plan:    #1. ESRD- due to HTN in setting of severe CM. Cont HD TTHS per usual routine    #2. CHF- appears compenstaed today    #3.  afib- rarte control and AC per cards    #4. Anemia- due to ESRD.  ESAs on hold with hgb>11

## 2019-12-03 NOTE — PROGRESS NOTES
[facility]  Cardiology Progress Note    Oli Stephanie Flaming.  Patient Status:  Inpatient    1945 MRN [id number]   [facility] 8NE-A Attending Jaqui Johnson MD   Hosp Day # 3 PCP VEE SCHUSTER     Subjective:  Overnight events noted, Medications:  • Metoprolol Succinate ER  12.5 mg Oral 2x Daily(Beta Blocker)   • amiodarone HCl  100 mg Oral Daily   • aspirin  81 mg Oral Daily   • calciTRIOL  0.25 mcg Oral Daily   • folic acid  1 mg Oral Daily   • Thiamine HCl  100 mg Oral Daily   •

## 2019-12-04 PROCEDURE — 99232 SBSQ HOSP IP/OBS MODERATE 35: CPT | Performed by: HOSPITALIST

## 2019-12-04 PROCEDURE — 99232 SBSQ HOSP IP/OBS MODERATE 35: CPT | Performed by: INTERNAL MEDICINE

## 2019-12-04 RX ORDER — BISACODYL 10 MG
10 SUPPOSITORY, RECTAL RECTAL
Status: DISCONTINUED | OUTPATIENT
Start: 2019-12-04 | End: 2019-12-13

## 2019-12-04 RX ORDER — ALBUMIN (HUMAN) 12.5 G/50ML
100 SOLUTION INTRAVENOUS AS NEEDED
Status: DISCONTINUED | OUTPATIENT
Start: 2019-12-04 | End: 2019-12-06

## 2019-12-04 RX ORDER — DIGOXIN 125 MCG
62.5 TABLET ORAL
Status: DISCONTINUED | OUTPATIENT
Start: 2019-12-04 | End: 2019-12-08

## 2019-12-04 RX ORDER — METOPROLOL SUCCINATE 25 MG/1
25 TABLET, EXTENDED RELEASE ORAL
Status: DISCONTINUED | OUTPATIENT
Start: 2019-12-04 | End: 2019-12-05

## 2019-12-04 NOTE — PLAN OF CARE
Received patient following night RN. Patient was alert on 2L NC. Patient in controlled afib- on cardizem. Cardizem was then discontinued and patient was placed on digoxin PO. Complaints of left sided abdominal pain-Tylenol was given.  Transfer orders receiv

## 2019-12-04 NOTE — PROGRESS NOTES
[address] [place] [address]. Patient Status:  Inpatient    1945 MRN [id number]   [facility] 4SW-A Attending ROBINSON Bermudez 21 Day # 4 PCP VEE Slade / Critical Care Progress Note     S: bp stable.   I no edema       Recent Labs   Lab 12/02/19  0526 12/03/19  0433 12/04/19  0443   WBC 9.6 8.2 6.8   HGB 11.1* 11.4* 11.3*   HCT 33.2* 33.6* 34.6*   .0 183.0 170.0     Recent Labs   Lab 12/02/19  0526 12/03/19  0433 12/04/19  0443   INR 4.74* 5.73* 5.9

## 2019-12-04 NOTE — PLAN OF CARE
Pt received on 3 L nc. Titrated for sats >92%. Denies shortness of breath/chest pain. Denies abdominal pain/nausea. Tender to palpation and feels slightly more distended tonight. (+) bowel sounds, flatus.    This evening pt disoriented x4, (change from yest

## 2019-12-04 NOTE — PROGRESS NOTES
[facility]  Progress Note     Collis Junie Gaucher.  Patient Status:  Inpatient    1945 MRN [id number]   [facility] 4SW-A Attending Stefanie [facility] Day # 5 PCP Sameera Valdes     Chief Complaint: Colitis, AF    S: P ALT 59  --  56  --   --   --    BILT 1.4  --  1.0  --   --   --    TP 7.4  --  6.2*  --   --   --     < > = values in this interval not displayed. Estimated Creatinine Clearance: 9 mL/min (A) (based on SCr of 7.9 mg/dL (H)).     Recent Labs   Lab 12 discharge to: TBD    Plan of care discussed with patient, RN and GI team.    Lacho Ceja MD

## 2019-12-04 NOTE — CM/SW NOTE
12/04/19 1100   CM/SW Referral Data   Referral Source Physician   Reason for Referral Discharge planning   Informant Patient   Social History   Recreational Drug/Alcohol Use no   Major Changes Last 6 Months no   Domestic/Partner Violence no   Suicidal I

## 2019-12-04 NOTE — PROGRESS NOTES
[facility]  Progress Note     Oli Montanez.  Patient Status:  Inpatient    1945 MRN [id number]   [facility] 4SW-A Attending Tangela Buchanan [address] Day # 4 PCP Harrison Camarena     Chief Complaint: Lethargy    S: Keri ALB 3.5 3.2*  --  2.7*  --   --    * 133*   < > 133* 132* 134*   K 5.2* 6.6*   < > 5.2* 5.7* 4.5   CL 96* 96*   < > 96* 96* 100   CO2 29.0 24.0   < > 26.0 25.0 24.0   ALKPHO 91 98  --  78  --   --    AST 43* 119*  --  66*  --   --    ALT 31 59  -- of discharge: TBD  Discharge is dependent on: Clinical improvement  At this point Mr. Benja Anderson is expected to be discharge to: TBD    Plan of care discussed with patient and nursing staff at bedside    Aby Hathaway DO

## 2019-12-04 NOTE — PROGRESS NOTES
[facility]  Cardiology Progress Note    Oli Hewitt Done.  Patient Status:  Inpatient    1945 MRN [id number]   [facility] [place] 8NE-A Attending Jeremias Sousa MD   Hosp Day # 4 PCP VEE SCHUSTER     Subjective:  Overnight events, BP imp HCl  100 mg Oral Daily   • aspirin  81 mg Oral Daily   • calciTRIOL  0.25 mcg Oral Daily   • folic acid  1 mg Oral Daily   • Thiamine HCl  100 mg Oral Daily   • Alfuzosin HCl ER  10 mg Oral Daily with breakfast   • Fluticasone Furoate-Vilanterol  1 puff In

## 2019-12-04 NOTE — PROGRESS NOTES
[facility]  Nephrology Progress Note    Oli Deedee Pa.  Patient Status:  Inpatient    1945 MRN [id number]   [facility] 8NE-A Attending Zoe Gardiner MD   Hosp Day # 4 PCP VEE SCHUSTER       SUBJECTIVE:  BP much better today * 47* 71* 82* 46*   CREATSERUM 11.20* 6.49* 8.43* 9.79* 6.38*   CA 8.2* 8.5 8.2* 8.3* 8.6   * 80 271* 124* 119*       Recent Labs   Lab 11/28/19  0132 11/30/19  1901 12/02/19  0526 12/03/19  0433   ALT 31 59 56  --    AST 43* 119* 66*  -- been stable from volume standpoint    #3.  afib- rarte control and AC per cards    #4. Anemia- due to ESRD. ESAs on hold with hgb>11    #5.  Hypotension- BP sig better today, follow        Ssi Palacios MD  12/4/2019  9:09 AM        '

## 2019-12-04 NOTE — PLAN OF CARE
Pt received in bed able to communicate his needs. Speech is difficult to understand. Pt completed HD with 1L removed. Family at bedside. POC explained to them. Cardizem gtt restarted per Cardiology order. D10 infusing. HR improving.  Will continue to [employer]

## 2019-12-05 ENCOUNTER — APPOINTMENT (OUTPATIENT)
Dept: CT IMAGING | Facility: HOSPITAL | Age: 74
DRG: 393 | End: 2019-12-05
Attending: HOSPITALIST
Payer: MEDICARE

## 2019-12-05 ENCOUNTER — APPOINTMENT (OUTPATIENT)
Dept: GENERAL RADIOLOGY | Facility: HOSPITAL | Age: 74
DRG: 393 | End: 2019-12-05
Attending: HOSPITALIST
Payer: MEDICARE

## 2019-12-05 PROCEDURE — 90935 HEMODIALYSIS ONE EVALUATION: CPT | Performed by: INTERNAL MEDICINE

## 2019-12-05 PROCEDURE — 99233 SBSQ HOSP IP/OBS HIGH 50: CPT | Performed by: HOSPITALIST

## 2019-12-05 PROCEDURE — 74174 CTA ABD&PLVS W/CONTRAST: CPT | Performed by: HOSPITALIST

## 2019-12-05 PROCEDURE — 71045 X-RAY EXAM CHEST 1 VIEW: CPT | Performed by: HOSPITALIST

## 2019-12-05 NOTE — PHYSICAL THERAPY NOTE
PT eval orders received, chart reviewed. Pt with INR 5.69. Will hold PT eval at this time due to elevated INR, pt not appropriate for PT with INR >5 per best practice due to increase risk of bleeding. KEO MORENO [facility] notified.  Will check back in am.

## 2019-12-05 NOTE — PROGRESS NOTES
[facility]  Cardiology Progress Note    Oli Wagner.  Patient Status:  Inpatient    1945 MRN [id number]   [facility] 2NE-A Attending Rona Eden MD   Hosp Day # 5 PCP VEE SCHUSTER     Subjective:  C/o liquid stools and a Fluticasone Furoate-Vilanterol  1 puff Inhalation Daily   • Sevelamer Carbonate  2,400 mg Oral TID CC   • piperacillin-tazobactam  3.375 g Intravenous Q12H     • phenylephrine     • dextrose 20 mL/hr at 12/05/19 0656       Assessment:  ?  AFib with RVR - ra

## 2019-12-05 NOTE — PROGRESS NOTES
Brief Note    Patient with colitis, etiology unknown  Still with bloody stool, but coagulopathic    Plan:  Check stool studies including Cdiff and PCR  Isolation  Will ask for GI consult  Tracee ONOFRE

## 2019-12-05 NOTE — PROGRESS NOTES
[facility]  Nephrology Progress Note    Oli Sanches.  Patient Status:  Inpatient    1945 MRN [id number]   [facility] 8NE-A Attending Chris Milligan MD   Hosp Day # 5 PCP VEE SCHUSTER       SUBJECTIVE:  Dialysis note, has had 5. 2* 5.7* 4.5 4.5   CL 99 96* 96* 100 95*   CO2 26.0 26.0 25.0 24.0 25.0   BUN 47* 71* 82* 46* 62*   CREATSERUM 6.49* 8.43* 9.79* 6.38* 7.90*   CA 8.5 8.2* 8.3* 8.6 8.6   GLU 80 271* 124* 119* 104*       Recent Labs   Lab 11/30/19  1901 12/02/19  0526 12/0 afib- rarte control and AC per cards    #4. Anemia- due to ESRD. ESAs on hold with hgb>11    #5.  Bloody stools- GI carmen Orellana MD  12/5/2019  1:31 PM        '

## 2019-12-05 NOTE — PROGRESS NOTES
Problem: Patient/Family Goals  Goal: Patient/Family Long Term Goal  Description  Patient's Long Term Goal: back to baseline    Interventions:  - treat infection  - monitor bp  - tolerate po  - pt    - See additional Care Plan goals for specific interventio emergency measures for life threatening arrhythmias  - Monitor electrolytes and administer replacement therapy as ordered  Outcome: Progressing     Problem: PAIN - ADULT  Goal: Verbalizes/displays adequate comfort level or patient's stated pain goal  Descr review patient's medication profile  Outcome: Progressing     Problem: METABOLIC/FLUID AND ELECTROLYTES - ADULT  Goal: Glucose maintained within prescribed range  Description  INTERVENTIONS:  - Monitor Blood Glucose as ordered  - Assess for signs and sympt ADULT  Goal: Absence of seizures  Description  INTERVENTIONS  - Monitor for seizure activity  - Administer anti-seizure medications as ordered  - Monitor neurological status  Outcome: Progressing     Problem: Impaired Activities of Daily Living  Goal: Achi

## 2019-12-05 NOTE — PLAN OF CARE
Transferred from ICU @ 2000. Patient resting comfortably in bed. AOX4- some forgetfulness, slow to respond. Oxygenating 98% on 2L NC- will continue to wean. A-fib on tele, Coumadin on hold due to elevated INR.  Patient complaining of 8/10 pain to L abd, con of vasoactive medications to optimize hemodynamic stability  - Monitor arterial and/or venous puncture sites for bleeding and/or hematoma  - Assess quality of pulses, skin color and temperature  - Assess for signs of decreased coronary artery perfusion - e environment to reduce risk of injury  - Provide assistive devices as appropriate  - Consider OT/PT consult to assist with strengthening/mobility  - Encourage toileting schedule  Outcome: Progressing     Problem: GASTROINTESTINAL - ADULT  Goal: Maintains or needed  Outcome: Progressing     Problem: MUSCULOSKELETAL - ADULT  Goal: Return mobility to safest level of function  Description  INTERVENTIONS:  - Assess patient stability and activity tolerance for standing, transferring and ambulating w/ or w/o assisti

## 2019-12-05 NOTE — CONSULTS
[facility]                       Gastroenterology [facility] Gastroenterology    [facility]  Patient Status:  Inpatient    1945 MRN [id number]   [facility] 2NE-A Attending Mook Link MD   Hosp Day # 5 PCP ANTWAN surgical history.   Medications: Metoprolol Succinate ER (Toprol XL) 24 hr tab 25 mg, 25 mg, Oral, 2x Daily(Beta Blocker)  digoxin (LANOXIN) tab 62.5 mcg, 62.5 mcg, Oral, Q MWF  Albumin Human (ALBUMINAR) 25 % solution 100 mL, 100 mL, Intravenous, PRN  bisac Sod-Tazobactam So (ZOSYN) 3.375 g in dextrose 5 % 100 mL ADD-vantage, 3.375 g, Intravenous, Once  [] HYDROmorphone HCl (DILAUDID) 1 MG/ML injection 0.5 mg, 0.5 mg, Intravenous, Q30 Min PRN  ondansetron HCl (ZOFRAN) injection 4 mg, 4 mg, Intravenous, irregular; tachycardic; + mechanical valve  Resp: Clear to auscultation bilaterally without wheezes; rubs, rhonchi, or rales  Abdomen: Soft, non-tender with moderate palpation, mildly distended with the presence of bowel sounds;  No hepatosplenomegaly; no r complains of weakness over the last couple days. He recently missed his dialysis appointment. FINDINGS:    LUNG BASES:  Marked cardiomegaly, unchanged. Emphysematous changes are noted. Bibasilar parenchymal scarring is noted. Epicardial leads noted. lesion, or calculus. PELVIC NODES:  No adenopathy. PELVIC ORGANS:  No visible mass. Pelvic organs appropriate for patient age. BONES:  No bony lesion or fracture. Stable degenerative changes. OTHER:  Negative.    =====  CONCLUSION:    1.  Keyonna Lees INR in a.m. Thank you for the consultation, we will follow the patient with you.   HAILE Cagle  11:05 AM  12/5/2019  [facility] Gastroenterology  [phone]    GI attending addendum:    I have personally seen and examined this patient and agree

## 2019-12-06 ENCOUNTER — APPOINTMENT (OUTPATIENT)
Dept: GENERAL RADIOLOGY | Facility: HOSPITAL | Age: 74
DRG: 393 | End: 2019-12-06
Attending: HOSPITALIST
Payer: MEDICARE

## 2019-12-06 PROCEDURE — 71045 X-RAY EXAM CHEST 1 VIEW: CPT | Performed by: HOSPITALIST

## 2019-12-06 PROCEDURE — 99233 SBSQ HOSP IP/OBS HIGH 50: CPT | Performed by: HOSPITALIST

## 2019-12-06 PROCEDURE — 99232 SBSQ HOSP IP/OBS MODERATE 35: CPT | Performed by: INTERNAL MEDICINE

## 2019-12-06 RX ORDER — METOPROLOL SUCCINATE 50 MG/1
50 TABLET, EXTENDED RELEASE ORAL
Status: DISCONTINUED | OUTPATIENT
Start: 2019-12-06 | End: 2019-12-06

## 2019-12-06 RX ORDER — HEPARIN SODIUM AND DEXTROSE 10000; 5 [USP'U]/100ML; G/100ML
12 INJECTION INTRAVENOUS ONCE
Status: COMPLETED | OUTPATIENT
Start: 2019-12-06 | End: 2019-12-06

## 2019-12-06 RX ORDER — METOPROLOL SUCCINATE 50 MG/1
50 TABLET, EXTENDED RELEASE ORAL ONCE
Status: COMPLETED | OUTPATIENT
Start: 2019-12-06 | End: 2019-12-06

## 2019-12-06 RX ORDER — HEPARIN SODIUM AND DEXTROSE 10000; 5 [USP'U]/100ML; G/100ML
INJECTION INTRAVENOUS CONTINUOUS
Status: DISCONTINUED | OUTPATIENT
Start: 2019-12-06 | End: 2019-12-10

## 2019-12-06 RX ORDER — ALBUMIN (HUMAN) 12.5 G/50ML
100 SOLUTION INTRAVENOUS AS NEEDED
Status: DISCONTINUED | OUTPATIENT
Start: 2019-12-06 | End: 2019-12-07

## 2019-12-06 RX ORDER — HEPARIN SODIUM 5000 [USP'U]/ML
60 INJECTION INTRAVENOUS; SUBCUTANEOUS ONCE
Status: COMPLETED | OUTPATIENT
Start: 2019-12-06 | End: 2019-12-06

## 2019-12-06 RX ORDER — HEPARIN SODIUM 10000 [USP'U]/100ML
INJECTION, SOLUTION INTRAVENOUS
Status: DISPENSED
Start: 2019-12-06 | End: 2019-12-07

## 2019-12-06 RX ORDER — LACTULOSE 10 G/15ML
30 SOLUTION ORAL 2 TIMES DAILY
Status: DISCONTINUED | OUTPATIENT
Start: 2019-12-06 | End: 2019-12-13

## 2019-12-06 RX ORDER — METOPROLOL SUCCINATE 100 MG/1
100 TABLET, EXTENDED RELEASE ORAL NIGHTLY
Status: DISCONTINUED | OUTPATIENT
Start: 2019-12-07 | End: 2019-12-08

## 2019-12-06 NOTE — PROGRESS NOTES
[facility]  Progress Note     Oli Woodson.  Patient Status:  Inpatient    1945 MRN [id number]   [facility] 4SW-A [facility] 6 PCP Denis Martel     Chief Complaint: Colitis, AF    S: P --    ALT 59  --  56  --   --   --   --    BILT 1.4  --  1.0  --   --   --   --    TP 7.4  --  6.2*  --   --   --   --     < > = values in this interval not displayed. Estimated Creatinine Clearance: 11.9 mL/min (A) (based on SCr of 5.98 mg/dL (H)). follow-up    Quality:  · DVT Prophylaxis: INR > 2  · CODE status: Full  · New: No  · Central line: No    Will the patient be referred to TCC on discharge?: Yes  Estimated date of discharge: TBD  Discharge is dependent on: Clinical course  At this point M

## 2019-12-06 NOTE — PHYSICAL THERAPY NOTE
PT eval and treat noted, currently patient with RN staff getting morning medication and then wanting to eat breakfast per RN. Will reattempt as schedule permits.

## 2019-12-06 NOTE — PROGRESS NOTES
Gastroenterology Progress Note  Oli Diaz.  Patient Status:  Inpatient    1945 MRN [id number]   [facility] 2NE-A Attending Devorah Turpin,  [facility] Day # 6 PCP VEE MORENO 15:45.  MARIELENA MONTILLA, CT ABDOMEN+PELVIS(CPT=74176), 11/30/2019, 19:15.      INDICATIONS:  F/U COLITIS     TECHNIQUE:  CT images of the abdomen and pelvis were obtained pre- and post- injection of non-ionic intravenous contrast material. Multi-planar reformat Additional subcentimeter hypodensities in both kidneys are too small to further characterize. 3 mm nonobstructing stone in the mid left kidney. No obstructive uropathy. ADRENALS:  No mass or enlargement.     RETROPERITONEUM:  Normal.  No mass or adenop 11. 9)  2. CT A/P suggesting colitis: GI PCR panel negative, C Diff to be collected. Pt most likely has component of ischemic colitis given periods of hypotension and cardiac/renal hx which maybe causing hematochezia rory with supra-therapeutic INR  3.   ESR

## 2019-12-06 NOTE — DIETARY NOTE
[facility] ASSESSMENT    Pt does not meet malnutrition criteria.     NUTRITION DIAGNOSIS/PROBLEM:    Inadequate oral intake related to decreased ability to consume sufficient energy intake (BIPAP) and physiologic causes increasin [address] [place] [address]

## 2019-12-06 NOTE — PROGRESS NOTES
ABG noted will place on BIPAP    Updated pulmonary, nephrology and cardiology service    Await remainder of labs, CXR    Low threshold to transfer to ICU    Tracee ONOFRE    Addendum    Recent Labs   Lab 12/05/19  0625 12/06/19  0624 12/06/19  1503   RBC 3.82

## 2019-12-06 NOTE — PLAN OF CARE
Rcv'd A/o/2  Slow response time forgetful at times  On tele C A-fib  INR=5.69  holding Coumadin  Lung sounds diminished bilateral   noted  Continue to have soft bloody stool  Zoysn  IV fluids   Lt AV fistulla arm precautions  Isolation for Contact plus

## 2019-12-06 NOTE — CONSULTS
[facility] Dosing Service  Warfarin (Coumadin) Initial Dosing    Oli Sal. is a 69year old male for whom pharmacy has been consulted to dose warfarin (COUMADIN) for with a mechanical aortic valve prosthesis by Dr. Bettie Turpin.   Based on this indica

## 2019-12-06 NOTE — PROGRESS NOTES
[facility] Cardiology Progress Note    Subjective:  Abdominal pain is better, still feels quite bloated. Objective:  BP 98/54 (BP Location: Right arm)   Pulse 101   Temp 98.3 °F (36.8 °C) (Oral)   Resp 18   Wt 165 lb 9.1 oz (75.1 kg)   SpO2 100%   BMI 21. valve prosthesis and therefore I would resume Coumadin this evening. I agree with the increased dose of metoprolol succinate, but I would give it once a day in the evening.   I would not discontinue amiodarone as it helps us keep is heart rate under contro

## 2019-12-06 NOTE — PROGRESS NOTES
Critical Care Progress Note        NAME: Geoffrey Garcia. - ROOM: [facility]30-B - MRN: [id number] - Age: 69year old - : 1945  Date of Admission: 2019  5:52 PM  Admission Diagnosis: Hyperkalemia [E87.5]  Hypoglycemia [E16.2]  ESRD (end stage re Furoate-Vilanterol  1 puff Inhalation Daily   • Sevelamer Carbonate  2,400 mg Oral TID CC   • piperacillin-tazobactam  3.375 g Intravenous Q12H     Continuous Infusing Medication:  • Continuous dose Heparin infusion     • dextrose 20 mL/hr at 12/05/19 2486 NATRIURETIC PEPTIDE   Date/Time Value Ref Range Status   07/22/2013 05:57  (H) 2 - 99 pg/mL Final     Comment:     [address], [address] [place], [place],[address]     TSH   Date/Time Value Ref Range Status   07/18/2013 07:10 AM

## 2019-12-06 NOTE — PROGRESS NOTES
[facility]  Nephrology Progress Note    Oli Diaz.  Patient Status:  Inpatient    1945 MRN [id number]   [facility] 8NE-A Attending Lucas Stern MD   Hosp Day # 6 PCP VEE SCHUSTER       SUBJECTIVE:  Looks/feels better tod 4.5 4.3   CL 96* 96* 100 95* 98   CO2 26.0 25.0 24.0 25.0 25.0   BUN 71* 82* 46* 62* 36*   CREATSERUM 8.43* 9.79* 6.38* 7.90* 5.98*   CA 8.2* 8.3* 8.6 8.6 8.9   * 124* 119* 104* 104*       Recent Labs   Lab 11/30/19  1901 12/02/19  0526 12/03/19  04 afib- rarte control and AC per cards    #4. Anemia- due to ESRD. ESAs on hold with hgb>11    #5. Bloody stools- hgb stable.   GI following      Baron Yuri MD  12/6/2019  10:29 AM      '

## 2019-12-07 ENCOUNTER — APPOINTMENT (OUTPATIENT)
Dept: CT IMAGING | Facility: HOSPITAL | Age: 74
DRG: 393 | End: 2019-12-07
Attending: HOSPITALIST
Payer: MEDICARE

## 2019-12-07 PROCEDURE — 99233 SBSQ HOSP IP/OBS HIGH 50: CPT | Performed by: HOSPITALIST

## 2019-12-07 PROCEDURE — 99232 SBSQ HOSP IP/OBS MODERATE 35: CPT | Performed by: INTERNAL MEDICINE

## 2019-12-07 PROCEDURE — 70450 CT HEAD/BRAIN W/O DYE: CPT | Performed by: HOSPITALIST

## 2019-12-07 PROCEDURE — 5A09357 ASSISTANCE WITH RESPIRATORY VENTILATION, LESS THAN 24 CONSECUTIVE HOURS, CONTINUOUS POSITIVE AIRWAY PRESSURE: ICD-10-PCS | Performed by: HOSPITALIST

## 2019-12-07 RX ORDER — PEPPERMINT OIL
5 OIL (ML) MISCELLANEOUS AS NEEDED
Status: DISCONTINUED | OUTPATIENT
Start: 2019-12-07 | End: 2019-12-13

## 2019-12-07 RX ORDER — MIDODRINE HYDROCHLORIDE 5 MG/1
10 TABLET ORAL
Status: COMPLETED | OUTPATIENT
Start: 2019-12-07 | End: 2019-12-07

## 2019-12-07 RX ORDER — MIDODRINE HYDROCHLORIDE 5 MG/1
10 TABLET ORAL
Status: DISCONTINUED | OUTPATIENT
Start: 2019-12-07 | End: 2019-12-07

## 2019-12-07 RX ORDER — WARFARIN SODIUM 2 MG/1
4 TABLET ORAL NIGHTLY
Status: DISCONTINUED | OUTPATIENT
Start: 2019-12-07 | End: 2019-12-08

## 2019-12-07 RX ORDER — ALBUMIN (HUMAN) 12.5 G/50ML
25 SOLUTION INTRAVENOUS
Status: DISCONTINUED | OUTPATIENT
Start: 2019-12-07 | End: 2019-12-09

## 2019-12-07 NOTE — PHYSICAL THERAPY NOTE
Order received, chart reviewed and attempted evaluation however, pt is on bedside HD at this time. Will follow up as appropriate.

## 2019-12-07 NOTE — PROGRESS NOTES
[facility]  Progress Note     Oli Moore.  Patient Status:  Inpatient    1945 MRN [id number]   [facility] 4SW-A Attending Kendal De Leon [address] Day # 7 PCP [address] [facility]     Chief Complaint: Colitis, AF    S: P Estimated Creatinine Clearance: 8.9 mL/min (A) (based on SCr of 7.75 mg/dL (H)).     Recent Labs   Lab 12/06/19  0624 12/06/19  1503 12/07/19  0828   PTP 25.0* 20.7* 18.5*   INR 2.11* 1.68* 1.46*       No results for input(s): TROP, CK in the last 168 follow-up    Quality:  · DVT Prophylaxis: Heparin drip  · CODE status: Full  · New: No  · Central line: No    Will the patient be referred to TCC on discharge?: Yes  Estimated date of discharge: TBD  Discharge is dependent on: Clinical course  At this po

## 2019-12-07 NOTE — PROGRESS NOTES
[facility]  Nephrology Progress Note    Oli Sutton.  Patient Status:  Inpatient    1945 MRN [id number]   [facility] 8NE-A Attending Kristen Kim MD   Hosp Day # 7 PCP VEE SCHUSTER       SUBJECTIVE:  Hypotensive earlier to 12/04/19  0443 12/05/19  0625 12/06/19  0624 12/06/19  1503 12/07/19  0828   * 130* 133* 129* 133*   K 4.5 4.5 4.3 4.4 4.2    95* 98 98 97*   CO2 24.0 25.0 25.0 19.0* 27.0   BUN 46* 62* 36* 40* 55*   CREATSERUM 6.38* 7.90* 5.98* 6.43* 7.75*   C 10 % infusion, , Intravenous, Continuous  ondansetron HCl (ZOFRAN) injection 4 mg, 4 mg, Intravenous, Q4H PRN  influenza vaccine (PF)(FLUZONE HD) high dose for 65 yrs & older inj 0.5ml, 0.5 mL, Intramuscular, Prior to discharge          Impression/Plan:

## 2019-12-07 NOTE — PROGRESS NOTES
Gastroenterology Progress Note  Patient Name: Renu Goodwin. Chief Complaint: colitis  S: C diff not done as specimen that was sent yesterday was formed. Per nursing, pt with 2 loose, non-bloody BMs overnight. Pt has not been c/o abdominal pain.  He is INR  3.  ESRD–HD  4.  A. fib on Coumadin with supratherapeutic INR: INR improved today to 1.46  5.  S/P metal AVR on Coumadin   6.  Hypotension: stable    7. AMS: pt with elevated ammonia but without known liver disease  Plan:   1.  Continue to optimize car

## 2019-12-07 NOTE — PLAN OF CARE
Rcv'd A/o/3 talking at 1900  More drowsy now  Chronulac given  Notified Respiratory and placed back on Bipap   noted  Lung sounds diminished bilateral  On tele with A-fib  Coumadin resumed INR=1.68  Heparin gtt  IV antibiotics  Hemodialysis in am @ 0700

## 2019-12-07 NOTE — PLAN OF CARE
Assumed pt care at 0730. Pt in bed resting quietly wearing Bipap. HD nurse in room setting up for todays treatment. No s/s of acute distress noted at this time. BP is low this am, nephrology notified for albumin orders.  Pt has BHARAT liriano emergency measures for life threatening arrhythmias  - Monitor electrolytes and administer replacement therapy as ordered  Outcome: Progressing     Problem: PAIN - ADULT  Goal: Verbalizes/displays adequate comfort level or patient's stated pain goal  Descr review patient's medication profile  Outcome: Progressing     Problem: METABOLIC/FLUID AND ELECTROLYTES - ADULT  Goal: Glucose maintained within prescribed range  Description  INTERVENTIONS:  - Monitor Blood Glucose as ordered  - Assess for signs and sympt ADULT  Goal: Absence of seizures  Description  INTERVENTIONS  - Monitor for seizure activity  - Administer anti-seizure medications as ordered  - Monitor neurological status  Outcome: Progressing     Problem: Impaired Activities of Daily Living  Goal: Achi

## 2019-12-07 NOTE — PROGRESS NOTES
Pharmacy Dosing Service: Warfarin (Coumadin)    Oli Robledo. is an 69year old male with a mechanical aortic valve prosthesis (on outpatient coumadin). Pharmacy was consulted to dose warfarin (COUMADIN) by Dr. Eileen Alcantara on 12/7/19.   Based on this indic

## 2019-12-07 NOTE — PROGRESS NOTES
Pulmonary Progress Note        NAME: Yoshi Downey. - ROOM: [facility]78-J - MRN: [id number] - Age: 69year old - : 1945        Last 24hrs: No events overnight, on bipap this AM, getting HD, somnolent but responsive    OBJECTIVE:   19  0945 12 178.0   INR  --  5.69* 2.11* 1.68* 1.46*       Recent Labs     12/05/19  0625 12/06/19  0624 12/06/19  1503 12/07/19  0828   * 133* 129* 133*   K 4.5 4.3 4.4 4.2   CL 95* 98 98 97*   CO2 25.0 25.0 19.0* 27.0   BUN 62* 36* 40* 55*   CA 8.6 8.9 9.0 8.8 hypoventilation related to elevated ammonia and AMS  -agree w/ bipap with sleep and PRN for confusion  -treat underlying issues  3. AMS  -lactulose per IM  4. Colitis  -zosyn  -GI following  5. FEN  -renal diet  -monitor lytes, replace as needed  6.  Proph

## 2019-12-07 NOTE — PLAN OF CARE
Pt is alert x 3 forgetful, on 2l of O2, Afib on the monitor. PT denies any cardiovascular symptoms at this time. Pt is a sit to stand, up with 2 assist, continent of B/B. All needs met and will continue to monitor.    This afternoon, the pt was lethargic, a interruptions  - Encourage family to assist in orientation and promotion of home routines  Outcome: Progressing     Problem: CARDIOVASCULAR - ADULT  Goal: Maintains optimal cardiac output and hemodynamic stability  Description  INTERVENTIONS:  - Monitor vi ADULT  Goal: Skin integrity remains intact  Description  INTERVENTIONS  - Assess and document risk factors for pressure ulcer development  - Assess and document skin integrity  - Monitor for areas of redness and/or skin breakdown  - Initiate interventions,

## 2019-12-07 NOTE — PROGRESS NOTES
[facility] Cardiology Progress Note    Subjective:  Easily arousable for me. On bipap so unable to communicate. Per nursing still confused during night.      Objective:  BP (!) 89/33 (BP Location: Left arm)   Pulse 99   Temp 98.6 °F (37 °C) (Oral)   Resp 18 HAILE  12/7/2019  8:00 AM

## 2019-12-08 PROCEDURE — 99232 SBSQ HOSP IP/OBS MODERATE 35: CPT | Performed by: INTERNAL MEDICINE

## 2019-12-08 PROCEDURE — 99233 SBSQ HOSP IP/OBS HIGH 50: CPT | Performed by: HOSPITALIST

## 2019-12-08 PROCEDURE — 99223 1ST HOSP IP/OBS HIGH 75: CPT | Performed by: OTHER

## 2019-12-08 RX ORDER — METOPROLOL TARTRATE 5 MG/5ML
5 INJECTION INTRAVENOUS EVERY 6 HOURS
Status: DISCONTINUED | OUTPATIENT
Start: 2019-12-08 | End: 2019-12-10

## 2019-12-08 RX ORDER — WARFARIN SODIUM 2 MG/1
4 TABLET ORAL NIGHTLY
Status: DISCONTINUED | OUTPATIENT
Start: 2019-12-08 | End: 2019-12-10

## 2019-12-08 RX ORDER — SIMETHICONE 80 MG
80 TABLET,CHEWABLE ORAL
Status: DISCONTINUED | OUTPATIENT
Start: 2019-12-08 | End: 2019-12-13

## 2019-12-08 NOTE — PROGRESS NOTES
[facility]  Nephrology Progress Note    Oli Author Kory.  Patient Status:  Inpatient    1945 MRN [id number]   [facility] 8NE-A Attending Julian Herrera MD   [facility] Day # 8 PCP VEE SCHUSTER       SUBJECTIVE:  Stable this afternoon 12/08/19  0544   * 133* 129* 133* 133*   K 4.5 4.3 4.4 4.2 4.1   CL 95* 98 98 97* 98   CO2 25.0 25.0 19.0* 27.0 25.0   BUN 62* 36* 40* 55* 33*   CREATSERUM 7.90* 5.98* 6.43* 7.75* 6.13*   CA 8.6 8.9 9.0 8.8 9.3   * 104* 113* 98 92       Recent % infusion, , Intravenous, Continuous  ondansetron HCl (ZOFRAN) injection 4 mg, 4 mg, Intravenous, Q4H PRN  influenza vaccine (PF)(FLUZONE HD) high dose for 65 yrs & older inj 0.5ml, 0.5 mL, Intramuscular, Prior to discharge          Impression/Plan:    #1

## 2019-12-08 NOTE — PROGRESS NOTES
[facility]  Progress Note     Oli Fowler.  Patient Status:  Inpatient    1945 MRN [id number]   [facility] 4SW-A Attending [place] [address] Day # 8 PCP [address] [facility]     Chief Complaint: Colitis, AF    S: P Tartrate  5 mg Intravenous Q6H   • Warfarin Sodium  4 mg Oral Nightly   • lactulose  30 g Oral BID   • amiodarone HCl  100 mg Oral Daily   • aspirin  81 mg Oral Daily   • calciTRIOL  0.25 mcg Oral Daily   • folic acid  1 mg Oral Daily   • Thiamine HCl  100

## 2019-12-08 NOTE — PLAN OF CARE
Patient oxygen saturations noted to be %. Patient able to answer his date of birth and name but took a little longer to answer location and was confused on situation/time. Bipap was placed per order due to patient's confusion.  Patient not noted to be

## 2019-12-08 NOTE — PROGRESS NOTES
[facility] Cardiology Progress Note    Subjective:  Awake and speaking to me this am, yet extremely weak. Abdomen is . Knows he is at [facility]. Per nursing became increasingly confused last pm when off bipap, improved then with bipap overnight. past  · Hypercapnic respiratory failure, requiring bipap  · Mental status changes, no acute changes on CT head      Plan:     · Heparin bridge until INR > 2, attempt to give coumadin tonight  · Change to IV lopressor until consistently able to take po meds

## 2019-12-08 NOTE — PHYSICAL THERAPY NOTE
Up on the recliner, attempted to see pt for skilled PT eval and is observed to be very lethargic and is unable to sustained a meanigful conversation without falling asleep no matter much I try to get his attention. Will follow up tomorrow.  Nursing is aware

## 2019-12-08 NOTE — PROGRESS NOTES
[facility] Neurology Preliminary Note    Geoffrey Garcia.  Patient Status:  Inpatient    1945 MRN [id number]   [facility] 2NE-A Attending Alfredito Mott MD   Hosp Day # 8 PCP VEE SCHUSTER     REASON FOR EVALUATION:  Virgilio Keita Date   • Abdominal aortic aneurysm - s/p repair    • Anemia in chronic kidney disease(285.21)    • Aortic valve replaced - mechanical    • Atrial fibrillation (HCC)    • Chronic airway obstruction, not elsewhere classified    • End Stage Renal disease - ho Intravenous, Q12H  dextrose 10 % infusion, , Intravenous, Continuous  ondansetron HCl (ZOFRAN) injection 4 mg, 4 mg, Intravenous, Q4H PRN  influenza vaccine (PF)(FLUZONE HD) high dose for 65 yrs & older inj 0.5ml, 0.5 mL, Intramuscular, Prior to discharge .0 12/08/2019    CREATSERUM 6.13 12/08/2019    BUN 33 12/08/2019     12/08/2019    K 4.1 12/08/2019    CL 98 12/08/2019    CO2 25.0 12/08/2019    GLU 92 12/08/2019    CA 9.3 12/08/2019    ALB 3.0 12/08/2019    ALKPHO 63 12/08/2019    BILT 1.2

## 2019-12-08 NOTE — PLAN OF CARE
Patient is alert and oriented x4 but gets forgetful and confused at times. Patient asking about plan of care. Patient updated on plan of care. Denies questions or concerns at this time. Patient verbalized understanding of education.  Plan is bipap prn for c assess for edema, trend weights  Outcome: Progressing  Goal: Absence of cardiac arrhythmias or at baseline  Description  INTERVENTIONS:  - Continuous cardiac monitoring, monitor vital signs, obtain 12 lead EKG if indicated  - Evaluate effectiveness of anti function  Description  INTERVENTIONS:  - Assess bowel function  - Maintain adequate hydration with IV or PO as ordered and tolerated  - Evaluate effectiveness of GI medications  - Encourage mobilization and activity  - Obtain nutritional consult as needed transfers and ambulation using safe patient handling equipment as needed  - Ensure adequate protection for wounds/incisions during mobilization  - Obtain PT/OT consults as needed  - Advance activity as appropriate  - Communicate ordered activity level and

## 2019-12-08 NOTE — PROGRESS NOTES
Gastroenterology Progress Note  Patient Name: Lilliam Celis. Chief Complaint: colitis  S: C diff neg. Pt with ongoing intermittent AMS. Pt has not been c/o abdominal pain. He had 2-3 Bms today without hematochezia.    O: BP (!) 164/48 (BP Location: [facility] admission  5.  S/P metal AVR on Coumadin   6.  Hypotension: stable    7. AMS: pt with elevated ammonia (could be due to CKD) as he is without known liver disease; AMS could be due to Co2 retention and he   Plan:   1.  Continue to optimize cardiac status; CT

## 2019-12-08 NOTE — CONSULTS
[facility] Dosing Service  Warfarin (Coumadin) Subsequent Dosing    Oli Nieto. is a 69year old male for whom pharmacy has been dosing warfarin (Coumadin).  Goal INR is 2.5-3.5    Recent Labs   Lab 12/05/19  0625 12/06/19  0624 12/06/19  1503 12/07

## 2019-12-08 NOTE — PROGRESS NOTES
Pulmonary Progress Note        NAME: Luz IbanezJonathan - ROOM: [facility]/5968-T - MRN: [id number] - Age: 69year old - : 1945        Last 24hrs: No events overnight, more awake today but remains slow to respond    OBJECTIVE:   19  0111 19  04 95.0 96.2 95.2 95.6   .0 171.0 188.0 168.0 178.0   INR  --  5.69* 2.11* 1.68* 1.46*       Recent Labs     12/05/19  0625 12/06/19  0624 12/06/19  1503 12/07/19  0828   * 133* 129* 133*   K 4.5 4.3 4.4 4.2   CL 95* 98 98 97*   CO2 25.0 25.0 19. hypoventilation related to elevated ammonia and AMS  -agree w/ bipap with sleep and PRN for confusion  -treat underlying issues  -opt sleep study  3. AMS  -lactulose per IM  -neuro following  4. Colitis  -zosyn  -GI following  5.  FEN  -renal diet  -monitor

## 2019-12-08 NOTE — PROGRESS NOTES
12/08/19 0415   BiPAP   $ RT Standby Charge (per 15 min) 1   $ BiPAP in use daily Yes   Device V60   BiPAP / CPAP CE# v60-3   Mode Spontaneous/Timed   Interface Full face mask   Mask Size Medium   Control Settings   Set Rate 12 breaths/min   Set IPAP 12

## 2019-12-08 NOTE — PLAN OF CARE
Assumed care of pt at 1. Pt alert to self and place, drowsy, Afib on tele, currently on BiPAP. When doing bedside report, pt had just been taken off bipap to eat dinner. He seemed more alert and able to follow commands then.  As time went on, and he was and/or hematoma  - Assess quality of pulses, skin color and temperature  - Assess for signs of decreased coronary artery perfusion - ex.  Angina  - Evaluate fluid balance, assess for edema, trend weights  Outcome: Progressing  Goal: Absence of cardiac arrhy strengthening/mobility  - Encourage toileting schedule  Outcome: Progressing     Problem: GASTROINTESTINAL - ADULT  Goal: Maintains or returns to baseline bowel function  Description  INTERVENTIONS:  - Assess bowel function  - Maintain adequate hydration w function  Description  INTERVENTIONS:  - Assess patient stability and activity tolerance for standing, transferring and ambulating w/ or w/o assistive devices  - Assist with transfers and ambulation using safe patient handling equipment as needed  - Ensure

## 2019-12-08 NOTE — CONSULTS
[address] [facility]  12/8/2019    11:24 AM      REASON FOR EVALUATION:  Altered mental status - lethargy, confusion     HISTORY OF PRESENT ILLNESS:  Corie Wilkinson is a 69year old male with a co replaced - mechanical    • Atrial fibrillation (HCC)    • Chronic airway obstruction, not elsewhere classified    • End Stage Renal disease - home hemodialysis    • Hemoperitoneum (nontraumatic)    • Hypertension    • Hyperlipidemia    • Seizure disorder ( mg, Intravenous, Q4H PRN  influenza vaccine (PF)(FLUZONE HD) high dose for 65 yrs & older inj 0.5ml, 0.5 mL, Intramuscular, Prior to discharge      REVIEW OF SYSTEMS:  A 10-point system was reviewed. Pertinent positives and negatives are noted in HPI. CL 98 12/08/2019    CO2 25.0 12/08/2019    GLU 92 12/08/2019    CA 9.3 12/08/2019    ALB 3.0 12/08/2019    ALKPHO 63 12/08/2019    BILT 1.2 12/08/2019    TP 6.8 12/08/2019    AST 22 12/08/2019    ALT 34 12/08/2019    PTT 55.6 12/08/2019    INR 1.56 12/0

## 2019-12-09 PROCEDURE — 95816 EEG AWAKE AND DROWSY: CPT | Performed by: OTHER

## 2019-12-09 PROCEDURE — 99233 SBSQ HOSP IP/OBS HIGH 50: CPT | Performed by: OTHER

## 2019-12-09 PROCEDURE — 99232 SBSQ HOSP IP/OBS MODERATE 35: CPT | Performed by: INTERNAL MEDICINE

## 2019-12-09 PROCEDURE — 99233 SBSQ HOSP IP/OBS HIGH 50: CPT | Performed by: HOSPITALIST

## 2019-12-09 RX ORDER — ALBUMIN (HUMAN) 12.5 G/50ML
100 SOLUTION INTRAVENOUS AS NEEDED
Status: DISCONTINUED | OUTPATIENT
Start: 2019-12-09 | End: 2019-12-11

## 2019-12-09 RX ORDER — MIDODRINE HYDROCHLORIDE 5 MG/1
10 TABLET ORAL
Status: COMPLETED | OUTPATIENT
Start: 2019-12-10 | End: 2019-12-10

## 2019-12-09 NOTE — PHYSICAL THERAPY NOTE
PHYSICAL THERAPY EVALUATION - INPATIENT     Room Number: 7928/3778-O  Evaluation Date: 12/9/2019  Type of Evaluation: Initial  Physician Order: PT Eval and Treat    Presenting Problem: Acute colitis; Respiratory Failure; Sepsis;  Acute encephalopathy; I Other (Comment)(elevator)  Stairs to Enter : 0     Stairs to Bedroom: 0       Lives With: Alone(some question about how frequent son is there)  Drives:  Other (Comment)(pt says off and on (I find this unlikely based on ESRD))  Patient Owned Equipment: Other RANGE OF MOTION AND STRENGTH ASSESSMENT  Upper extremity ROM and strength are within functional limits except for the following:    Right Shoulder flexion 50% ; Strength 2+/5  Left Shoulder flexion 50% ; Strength 2+/5  Right Elbow flexion WNL ; Strength 4 tested  Comment : na      Skilled Therapy Provided:     During eval and treat, pt on 3.5 L O2. Using BiPap at night.      Bed Mobility:  Rolling right = NT   Rolling left = NT    Supine to sit = NT   Sit to supine = min assist     Transfer Mobility:  Sit to considered moderate. These impairments and comorbidities manifest themselves as functional limitations in independent bed mobility, transfers and gait.    The patient is below baseline and would benefit from skilled inpatient PT to address the above defi

## 2019-12-09 NOTE — PROGRESS NOTES
12/09/19 0259   BiPAP   $ RT Standby Charge (per 15 min) 1   $ BiPAP in use daily Yes   Device V60   BiPAP / CPAP CE# v60-3   Mode Spontaneous/Timed   Interface Full face mask   Mask Size Medium   Control Settings   Set Rate 12 breaths/min   Set IPAP 12

## 2019-12-09 NOTE — PROGRESS NOTES
[facility] Neurology Progress Note    Oli Burnette.  Patient Status:  Inpatient    1945 MRN [id number]   [facility] 2NE-A Attending Leila Cabrales,  [facility] Day # 9 PCP [address] [facility]      Neurology Signs: Blood pressure 112/66, pulse 109, temperature 97.7 °F (36.5 °C), temperature source Oral, resp. rate 18, weight 145 lb 15.1 oz (66.2 kg), SpO2 97 %.     General: Well developed, well nourished   HEENT: Mucous membranes moist  Skin: warm,dry    Neuro Hyponatremia     Acute renal failure (ARF) (HCC)     Acute kidney injury ([facility] [address]     Acute colitis     ESRD (end stage renal disease) ([facility] [address]     Atrial fibrillation, chronic (HCC)     Hypoglycemia     H/O mechanical aortic valve replacement     ESRD on hemodi

## 2019-12-09 NOTE — PHYSICAL THERAPY NOTE
Attempted to see pt. Pt up in b/s chair. Pt is alert. Pt occupied with nursing for clean up. Will cont to follow. Will retry later in the day if time allows.

## 2019-12-09 NOTE — PLAN OF CARE
Pt is alert x 2 to 3, fatigued, pt has periods were he is more awake than others, on 2L O2, and BIPAPat noc/PRN when confused, AFIb on the monitor. PT denies any cardiovascular symptoms at this time. Pt is up x2 and SBA, incontinent of bowel.  All needs met signs of decreased coronary artery perfusion - ex.  Angina  - Evaluate fluid balance, assess for edema, trend weights  Outcome: Progressing  Goal: Absence of cardiac arrhythmias or at baseline  Description  INTERVENTIONS:  - Continuous cardiac monitoring, m as ordered  - Assess for signs and symptoms of hyperglycemia and hypoglycemia  - Administer ordered medications to maintain glucose within target range  - Assess barriers to adequate nutritional intake and initiate nutrition consult as needed  - Instruct p

## 2019-12-09 NOTE — CM/SW NOTE
BREA met with pt's daughter this afternoon to discuss dc plans. PT recs [facility]. Pt is a dialysis pt. Daughter has been given [facility] list with quality data. She will review. Pointed out SARs that have on-site dialysis. NH screen requested.   Discussed potentia

## 2019-12-09 NOTE — PROGRESS NOTES
[facility] Dosing Service  Warfarin (Coumadin) Subsequent Dosing    Olitee Sanches. is a 69year old male for whom pharmacy has been dosing warfarin (Coumadin). Goal INR is 2.5-3.5.     Recent Labs   Lab 12/06/19  2512 12/06/19  1503 12/07/19  0828 12/0

## 2019-12-09 NOTE — PROGRESS NOTES
[facility]  Nephrology Progress Note    Oli Burnette.  Patient Status:  Inpatient    1945 MRN [id number]   [facility] 8NE-A Attending Dean Johnson MD   Hosp Day # 9 PCP VEE SCHUSTER       SUBJECTIVE:  Stable this AM Recent Labs   Lab 12/05/19  0625 12/06/19  0624 12/06/19  1503 12/07/19  0828 12/08/19  0544   * 133* 129* 133* 133*   K 4.5 4.3 4.4 4.2 4.1   CL 95* 98 98 97* 98   CO2 25.0 25.0 19.0* 27.0 25.0   BUN 62* 36* 40* 55* 33*   CREATSERUM 7.90* 5.98 [facility]) injection 4 mg, 4 mg, Intravenous, Q4H PRN  influenza vaccine (PF)(FLUZONE HD) high dose for 65 yrs & older inj 0.5ml, 0.5 mL, Intramuscular, Prior to discharge          Impression/Plan:    #1. ESRD- due to HTN in setting of severe CM.   Cont HD TT

## 2019-12-09 NOTE — PLAN OF CARE
Problem: CARDIOVASCULAR - ADULT  Goal: Absence of cardiac arrhythmias or at baseline  Description  INTERVENTIONS:  - Continuous cardiac monitoring, monitor vital signs, obtain 12 lead EKG if indicated  - Evaluate effectiveness of antiarrhythmic and heart gtt infusing at 750 units/hour. Patient also received dose of Warfarin. No c/o pain voiced. Up to the bedside commode multiple times with assist. LFA  fistula with + thrill and bruit. Patient to have EEG Monday.

## 2019-12-09 NOTE — PROGRESS NOTES
[facility]  Progress Note     Oli Guerrero.  Patient Status:  Inpatient    1945 MRN [id number]   [facility] 4SW-A Attending Kelvin Simpson [facility] Day # 9 PCP [address] [facility]     Chief Complaint: Colitis, AF    S: P input(s): TROP, CK in the last 168 hours. Imaging: Imaging data reviewed in Epic.     Medications:   • [START ON 12/10/2019] Midodrine HCl  10 mg Oral Once in dialysis   • metoprolol Tartrate  5 mg Intravenous Q6H   • Warfarin Sodium  4 mg Oral Nightly

## 2019-12-09 NOTE — PROGRESS NOTES
[facility]  Cardiology Progress Note    Subjective:  Apparently was more alert earlier today and took all his meds. Now is somewhat lethargic. Will answer questions but takes some time and answers are short, 1-2 word answers.       Objective:   on admit up to 5.9, 5 mg vit K given 12/5  · Hx thoracoabdominal aortic aneurysm , urgent repair 2003, redo repair 2015  · Hx COPD  · Hx NSVT, declines ICD in past  · Hypercapnic respiratory failure, requiring bipap  · TME, associated mental status changes

## 2019-12-09 NOTE — PROCEDURES
ELECTROENCEPHALOGRAM REPORT      Patient Name: Juli Lowe. Chart ID: [id number]  Ordering Physician: Dr Emeyl Alexander Date of Test: 12/9/2019  Patient Diagnosis: AMS    HISTORY  A 75YO MALE WHO PRESENTS WITH HYPERKALEMIA.  PT HAS HAD SOME REPIRATORY FALIU

## 2019-12-09 NOTE — PROGRESS NOTES
Pulmonary Progress Note        NAME: Joan Singleton. - ROOM: [address] [place], [place] [address]- - MRN: [id number] - Age: 69year old - : 1945        Last 24hrs: No events overnight, appears somnolent now, needs BiPAP    OBJECTIVE:   19  0259 19  0537  12/07/19  0828   WBC 6.4 6.8 7.9 6.7 7.5   HGB 11.4* 11.9* 11.8* 11.9* 11.4*   MCV 95.0 95.0 96.2 95.2 95.6   .0 171.0 188.0 168.0 178.0   INR  --  5.69* 2.11* 1.68* 1.46*       Recent Labs     12/05/19  0625 12/06/19  0624 12/06/19  1503 12/07/19 midodrine regularly  -monitor  2. Hypercapnic Resp Failure  -?underlying NANCY vs hypoventilation related to elevated ammonia and AMS  -agree w/ bipap with sleep and PRN for confusion  -treat underlying issues  -opt sleep study  -wean O2 during the day  3.  A

## 2019-12-10 PROCEDURE — 90935 HEMODIALYSIS ONE EVALUATION: CPT | Performed by: INTERNAL MEDICINE

## 2019-12-10 PROCEDURE — 99232 SBSQ HOSP IP/OBS MODERATE 35: CPT | Performed by: HOSPITALIST

## 2019-12-10 RX ORDER — LIDOCAINE AND PRILOCAINE 25; 25 MG/G; MG/G
CREAM TOPICAL ONCE
Status: COMPLETED | OUTPATIENT
Start: 2019-12-10 | End: 2019-12-10

## 2019-12-10 RX ORDER — WARFARIN SODIUM 1 MG/1
1 TABLET ORAL NIGHTLY
Status: DISCONTINUED | OUTPATIENT
Start: 2019-12-10 | End: 2019-12-11

## 2019-12-10 NOTE — PLAN OF CARE
Assumed pt at 0730. Pt in bed eyes closed but easy to arouse. BiPap in place. BiPab was taken off for 11/2 hours and put on 3LPM nasal cannula,  noted pt more drowsy BiPap put back on. Pt getting HD at this moment. Plan of care dicussed with pt.    Call edema, trend weights  Outcome: Progressing  Goal: Absence of cardiac arrhythmias or at baseline  Description  INTERVENTIONS:  - Continuous cardiac monitoring, monitor vital signs, obtain 12 lead EKG if indicated  - Evaluate effectiveness of antiarrhythmic function  Description  INTERVENTIONS:  - Assess bowel function  - Maintain adequate hydration with IV or PO as ordered and tolerated  - Evaluate effectiveness of GI medications  - Encourage mobilization and activity  - Obtain nutritional consult as needed transfers and ambulation using safe patient handling equipment as needed  - Ensure adequate protection for wounds/incisions during mobilization  - Obtain PT/OT consults as needed  - Advance activity as appropriate  - Communicate ordered activity level and mobility/gait  Description  Interventions:  - Assess patient's functional ability and stability  - Promote increasing activity/tolerance for mobility and gait  - Educate and engage patient/family in tolerated activity level and precautions  - Recommend use

## 2019-12-10 NOTE — PROGRESS NOTES
[facility]  Cardiology Progress Note    Subjective:  No chest pain or shortness of breath. Getting ready for hemodialysis today and is asking for EMLA cream for his fistula for access.       Objective:  BP 93/46 (BP Location: Left leg)   Pulse 114   T for now. Was able to take Warfarin last night. · Colitis, felt to be ischemic, continues with some abd. tenderness. Remains afebrile, WBC ok. · Elevated ammonia level - GI following  · ESRD on HD  · Hx mechanical AV, [facility], INR 2.

## 2019-12-10 NOTE — PROGRESS NOTES
Pharmacy signing off warfarin dosing to cardiology for Oli Vega Pac. Please reconsult pharmacy if further dosing recommendations are needed.    Thanks, Valentina Heath D

## 2019-12-10 NOTE — PROGRESS NOTES
RECEIVED PT ON BIPAP. WAS ON AND OFF ALL DAY. WOULD TOLERATE NO LONGER THAN 3 HRS OFF BIPAP WITHOUT NEEDING TO BE PUT BACK ON BECAUSE OF LETHARGY.  WILL CONT BIPAP PRN AND AT NOC.     12/09/19 1710   BiPAP   $ RT Standby Charge (per 15 min) 1   Device V60

## 2019-12-10 NOTE — OCCUPATIONAL THERAPY NOTE
OCCUPATIONAL THERAPY EVALUATION - INPATIENT     Room Number: 7533/0330-R  Evaluation Date: 12/10/2019  Type of Evaluation: Initial  Presenting Problem: Acute colitis    Physician Order: IP Consult to Occupational Therapy  Reason for Therapy: ADL/IADL Dysfu question about how frequent son is there)    Toilet and Equipment: Standard height toilet  Shower/Tub and Equipment: Shower chair;Tub-shower combo  Other Equipment: None    Occupation/Status: Retired  Hand Dominance: Right  Drives:  Other (Comment)(pt says able to track stimulus in all quads without difficulty  Peripheral:  intact    PERCEPTION  Overall Perception Status:   WFL - within functional limits    SENSATION  Light touch:  intact    Communication: Patient with slowed processing and responses to ques socks in sitting. He performed B UE AROM exercises with overhead reach and crossing midline x 10 reps each with mod/max tactile and verbal cues. He performed B scapula retraction exercises x 10 reps with mod tactile and verbal cues.  He required moderate as Complexity  Occupational Profile/Medical History MODERATE - Expanded review of history including review of medical or therapy record   Specific performance deficits impacting engagement in ADL/IADL MODERATE  3 - 5 performance deficits   Client Assessment/P

## 2019-12-10 NOTE — PLAN OF CARE
Assumed care of p a 1930. Pt alert, slightly drowsy, oriented to place and person. Afib on tele, saturating above 95% on 2L O2. Pt off of BiPAP during shift change, sitting in bed eating dinner. Able to take HS dose of warfarin.  Lactulose held per MD d/t and signs of decreased cardiac output  - Evaluate effectiveness of vasoactive medications to optimize hemodynamic stability  - Monitor arterial and/or venous puncture sites for bleeding and/or hematoma  - Assess quality of pulses, skin color and temperatur for assistance with activity based on assessment  - Modify environment to reduce risk of injury  - Provide assistive devices as appropriate  - Consider OT/PT consult to assist with strengthening/mobility  - Encourage toileting schedule  Outcome: Progressin interventions, skin care algorithm/standards of care as needed  Outcome: Progressing     Problem: MUSCULOSKELETAL - ADULT  Goal: Return mobility to safest level of function  Description  INTERVENTIONS:  - Assess patient stability and activity tolerance for stability  - Promote increasing activity/tolerance for mobility and gait  - Educate and engage patient/family in tolerated activity level and precautions  - Recommend use of  RW for transfers and ambulation   Outcome: Progressing

## 2019-12-10 NOTE — CM/SW NOTE
SW attempted to meet with pt earlier today, he was sound asleep. Left message for his daughter regarding [facility] choice, will follow.     Sandra Recinos, [id number]

## 2019-12-10 NOTE — PROGRESS NOTES
[facility]  Nephrology Progress Note    Oli Sanches.  Patient Status:  Inpatient    1945 MRN [id number]   [facility] 8NE-A Attending Chris Milligan MD   Hosp Day # 10 PCP VEE SCHUSTER       SUBJECTIVE:  Dialysis note, stable BAND  --   --   --   --   --  1   INR 1.68* 1.46* 1.56* 1.86*  --  2.85*       Recent Labs   Lab 12/06/19  1503 12/07/19  0828 12/08/19  0544 12/09/19  1544 12/10/19  0547   * 133* 133* 132* 130*   K 4.4 4.2 4.1 4.3 4.3   CL 98 97* 98 98 97*   CO2 800 mg, 2,400 mg, Oral, TID CC  acetaminophen (TYLENOL) tab 650 mg, 650 mg, Oral, Q6H PRN  dextrose 10 % infusion, , Intravenous, Continuous  ondansetron HCl (ZOFRAN) injection 4 mg, 4 mg, Intravenous, Q4H PRN  influenza vaccine (PF)(FLUZONE HD) high dose

## 2019-12-11 PROCEDURE — 99232 SBSQ HOSP IP/OBS MODERATE 35: CPT | Performed by: INTERNAL MEDICINE

## 2019-12-11 PROCEDURE — 99232 SBSQ HOSP IP/OBS MODERATE 35: CPT | Performed by: HOSPITALIST

## 2019-12-11 RX ORDER — ALBUMIN (HUMAN) 12.5 G/50ML
100 SOLUTION INTRAVENOUS AS NEEDED
Status: DISCONTINUED | OUTPATIENT
Start: 2019-12-11 | End: 2019-12-13

## 2019-12-11 RX ORDER — LIDOCAINE AND PRILOCAINE 25; 25 MG/G; MG/G
CREAM TOPICAL
Status: COMPLETED | OUTPATIENT
Start: 2019-12-12 | End: 2019-12-12

## 2019-12-11 RX ORDER — WARFARIN SODIUM 1 MG/1
1 TABLET ORAL DAILY
Status: DISCONTINUED | OUTPATIENT
Start: 2019-12-11 | End: 2019-12-11

## 2019-12-11 RX ORDER — MIDODRINE HYDROCHLORIDE 5 MG/1
10 TABLET ORAL
Status: COMPLETED | OUTPATIENT
Start: 2019-12-12 | End: 2019-12-12

## 2019-12-11 NOTE — CM/SW NOTE
Care Coordination Note    Progression of Care: Hospital Day # 11  Saw pt at bedside to assess his progress due to prolong hospital course and confirm discharge plan.  Pt was resting in bed on bipap, arousable but appeared sleepy, thus he gave me the head no

## 2019-12-11 NOTE — PROGRESS NOTES
[facility]  Progress Note     [facility].  Patient Status:  Inpatient    1945 MRN [id number]   [facility] 4SW-A [facility] Sophia Garcia     Chief Complaint: Colitis, AF    S: 41.6*   INR 1.86* 2.85* 3.96*       No results for input(s): TROP, CK in the last 168 hours. Imaging: Imaging data reviewed in Epic.     Medications:   • Warfarin Sodium  1 mg Oral Daily   • [START ON 12/12/2019] lidocaine-prilocaine   Topical Once in d expected to be discharge to: TBD    Plan of care discussed with patient and RN.     Ana Lozoya MD  [facility]

## 2019-12-11 NOTE — PROGRESS NOTES
[facility]  Nephrology Progress Note    Oli De Record.  Patient Status:  Inpatient    1945 MRN [id number]   [facility] 8NE-A Attending Franci Harman MD   Hosp Day # 6 PCP VEE SCHUSTER       SUBJECTIVE:  More alert today, int 97.2 101.7* 94.9   .0 175.0  --  198.0 197.0 223.0   BAND  --   --   --   --  1  --    INR 1.46* 1.56* 1.86*  --  2.85* 3.96*       Recent Labs   Lab 12/07/19  0828 12/08/19  0544 12/09/19  1544 12/10/19  0547 12/11/19  0653   * 133* 132* 130* Continuous  ondansetron HCl (ZOFRAN) injection 4 mg, 4 mg, Intravenous, Q4H PRN  influenza vaccine (PF)(FLUZONE HD) high dose for 65 yrs & older inj 0.5ml, 0.5 mL, Intramuscular, Prior to discharge          Impression/Plan:    #1.  ESRD- due to HTN in [facility]

## 2019-12-11 NOTE — PROGRESS NOTES
[facility]  Progress Note     Oli Bermudez.  Patient Status:  Inpatient    1945 MRN [id number]   [facility] 4SW-A Attending Sonja JavierLists of hospitals in the United StatesDAVID [facility] Day # 10 PCP Fannie Dandy     Chief Complaint: Colitis, AF    S: 22.5* 31.9*   INR 1.56* 1.86* 2.85*       No results for input(s): TROP, CK in the last 168 hours. Imaging: Imaging data reviewed in Epic.     Medications:   • metoprolol tartrate  12.5 mg Oral 2x Daily(Beta Blocker)   • Warfarin Sodium  1 mg Oral Night RN.    Mayr Kraft MD

## 2019-12-11 NOTE — PROGRESS NOTES
Pulmonary Progress Note        NAME: Renu Goodwin. - ROOM: [address] [place] [address]- - MRN: [id number] - Age: 69year old - : 1945        Last 24hrs: No events overnight, somewhat slow to respond but answers questions appropriately    OBJECTIVE:   12/10/19  2 WBC  --  10.9 9.2 11.3*   HGB  --  12.3* 13.4 12.3*   MCV  --  97.2 101.7* 94.9   PLT  --  198.0 197.0 223.0   BAND  --   --  1  --    INR 1.86*  --  2.85* 3.96*       Recent Labs     12/09/19  1544 12/10/19  0547 12/11/19  0653   * 130* 132*   K 4 gtt  3. Dispo  -d/c planning      Hamilton Morales  [facility]

## 2019-12-11 NOTE — RESPIRATORY THERAPY NOTE
NANCY Equipment Usage Summary :            Set Mode :BILEVEL S/T          Usage in Hours:10;16          90% Pressure (EPAP) : 6           90% Insp Pressure (IPAP);12          AHI : 7.7          Supplemental Oxygen :  3    LPM

## 2019-12-11 NOTE — PLAN OF CARE
Obtained pt at 1930. Pt is a/o x2 (name and birthday) and delayed responses. Lung are diminished and pt is currently on bipap. HR is controlled afib on telemetry. Bowel sounds are present in all four quadrants and had a bm tonight.  Pt denied warfarin and l cardiac output  - Evaluate effectiveness of vasoactive medications to optimize hemodynamic stability  - Monitor arterial and/or venous puncture sites for bleeding and/or hematoma  - Assess quality of pulses, skin color and temperature  - Assess for signs o activity based on assessment  - Modify environment to reduce risk of injury  - Provide assistive devices as appropriate  - Consider OT/PT consult to assist with strengthening/mobility  - Encourage toileting schedule  Outcome: Progressing     Problem: Barbara Ek care algorithm/standards of care as needed  Outcome: Progressing     Problem: MUSCULOSKELETAL - ADULT  Goal: Return mobility to safest level of function  Description  INTERVENTIONS:  - Assess patient stability and activity tolerance for standing, transferr medications to maintain glucose within target range  - Assess barriers to adequate nutritional intake and initiate nutrition consult as needed  - Instruct patient on self management of diabetes  Outcome: Progressing     Problem: Impaired Functional Mobilit

## 2019-12-11 NOTE — DIETARY NOTE
[facility]     NUTRITION FOLLOW UP ASSESSMENT     Pt does not meet malnutrition criteria.     NUTRITION DIAGNOSIS/PROBLEM:     Inadequate oral intake related to decreased ability to consume sufficient energy intake (BIPAP) and physiologic causes incre calories per kg)  Protein: 90 grams protein/day (1.2 grams protein per kg)  Fluid: 1000 ml + urine output or per MD     MONITOR AND EVALUATE/NUTRITION GOALS:    1. PO intake to meet at least 75% patient nutrition prescription (not met, ongoing)  2.  At leas

## 2019-12-11 NOTE — PROGRESS NOTES
[facility]  Cardiology Progress Note    Subjective:  No chest pain or shortness of breath but remains lethargic. Was off BiPaP this morning from around 0730 to 1315, then had to be placed back on due to increasing lethargy per RN.     Objective:  BP ( hypoglycemia  · Chronic systolic HF, LVEF 13-26%, volume management with HD, looks euvolemic to dry, hypotension improved, receives PRN midodrine   · PAF, actively in afib now.  Rates 90's - low 100's, was unable to take toprol xl or warfarin over weekend

## 2019-12-11 NOTE — PLAN OF CARE
Assumed pt care at 0730. Pt in bed resting quietly, asking to get up OOB to chair. No s/s of acute distress noted at this time. VSS. Pt denies any pain at this time. Pt is wearing a cpap. Alert and oriented X 2 (person, place).  Pt is sti Verbalizes/displays adequate comfort level or patient's stated pain goal  Description  INTERVENTIONS:  - Encourage pt to monitor pain and request assistance  - Assess pain using appropriate pain scale  - Administer analgesics based on type and severity of range  Description  INTERVENTIONS:  - Monitor Blood Glucose as ordered  - Assess for signs and symptoms of hyperglycemia and hypoglycemia  - Administer ordered medications to maintain glucose within target range  - Assess barriers to adequate nutritional i

## 2019-12-12 PROBLEM — G47.33 OSA ON CPAP: Status: ACTIVE | Noted: 2019-12-12

## 2019-12-12 PROBLEM — Z99.89 OSA ON CPAP: Status: ACTIVE | Noted: 2019-12-12

## 2019-12-12 PROCEDURE — 99232 SBSQ HOSP IP/OBS MODERATE 35: CPT | Performed by: HOSPITALIST

## 2019-12-12 PROCEDURE — 90935 HEMODIALYSIS ONE EVALUATION: CPT | Performed by: INTERNAL MEDICINE

## 2019-12-12 RX ORDER — WARFARIN SODIUM 1 MG/1
1 TABLET ORAL
Status: DISCONTINUED | OUTPATIENT
Start: 2019-12-12 | End: 2019-12-13

## 2019-12-12 NOTE — CM/SW NOTE
BREA follow up on [facility] referrals:    1. [facility] can not accept. 2. [facility] [address] can not accept at this time but willing to consider in the future should pt improve.   3. [facility] will be willing to accept, they are working on getting him approved for in-h

## 2019-12-12 NOTE — PROGRESS NOTES
[facility]  Progress Note     Oli Diaz.  Patient Status:  Inpatient    1945 MRN [id number]   [facility] 4SW-A [facility] Oscar Zhang     Chief Complaint: Colitis, AF    S: 12/11/19  0653 12/12/19  0643   PTP 31.9* 41.6* 41.7*   INR 2.85* 3.96* 3.97*       No results for input(s): TROP, CK in the last 168 hours. Imaging: Imaging data reviewed in Epic.     Medications:   • metoprolol tartrate  12.5 mg Oral 2x Daily(Beta Blo Minnie Murry, [address]

## 2019-12-12 NOTE — PLAN OF CARE
Obtained pt at 1930. Pt is a/o x2 (name and birthday) and delayed responses. Lung are diminished and pt is currently on bipap. HR is controlled afib on telemetry. Bowel sounds are present in all four quadrants and had a bm tonight.  Denies any chest pain or Evaluate fluid balance, assess for edema, trend weights  Outcome: Progressing  Goal: Absence of cardiac arrhythmias or at baseline  Description  INTERVENTIONS:  - Continuous cardiac monitoring, monitor vital signs, obtain 12 lead EKG if indicated  - Evalua baseline bowel function  Description  INTERVENTIONS:  - Assess bowel function  - Maintain adequate hydration with IV or PO as ordered and tolerated  - Evaluate effectiveness of GI medications  - Encourage mobilization and activity  - Obtain nutritional con Assist with transfers and ambulation using safe patient handling equipment as needed  - Ensure adequate protection for wounds/incisions during mobilization  - Obtain PT/OT consults as needed  - Advance activity as appropriate  - Communicate ordered activit mobility/gait  Description  Interventions:  - Assess patient's functional ability and stability  - Promote increasing activity/tolerance for mobility and gait  - Educate and engage patient/family in tolerated activity level and precautions  - Recommend use

## 2019-12-12 NOTE — PROGRESS NOTES
[facility]  Nephrology Progress Note    Oli Burnette.  Patient Status:  Inpatient    1945 MRN [id number]   [facility] 8NE-A Attending Dean Johnson MD   Hosp Day # 12 PCP VEE SCHUSTER       SUBJECTIVE:  Dialysis note, stable 12.3*  --    MCV 95.6 95.7  --  97.2 101.7* 94.9  --    .0 175.0  --  198.0 197.0 223.0  --    BAND  --   --   --   --  1  --   --    INR 1.46* 1.56* 1.86*  --  2.85* 3.96* 3.97*       Recent Labs   Lab 12/07/19  0828 12/08/19  0544 12/09/19  1544 1 800 mg, 2,400 mg, Oral, TID CC  acetaminophen (TYLENOL) tab 650 mg, 650 mg, Oral, Q6H PRN  ondansetron HCl (ZOFRAN) injection 4 mg, 4 mg, Intravenous, Q4H PRN  influenza vaccine (PF)(FLUZONE HD) high dose for 65 yrs & older inj 0.5ml, 0.5 mL, Intramuscular

## 2019-12-12 NOTE — RESPIRATORY THERAPY NOTE
NANCY : EQUIPMENT USE: DAILY SUMMARY                                            SET MODE: BILEVEL S/T                                          USAGE IN HOURS: 9:19                                          90% EXP. PRE

## 2019-12-12 NOTE — PHYSICAL THERAPY NOTE
Duplicate PT eval order received today. Spoke with the case [address] [place] [address] who was unaware of the evaluation being complicated on 54/2/18. Order was acknowledged but the  was notified that pt is already on caseload for treatment.

## 2019-12-12 NOTE — PROGRESS NOTES
Pulmonary Progress Note     Assessment / Plan:  1.  Hypercapnic Resp Failure  -?underlying NANCY vs hypoventilation related to TME  -BIPAP with sleep and PRN  -volume management per nephrology  -outpatient sleep study  -wean O2 during the day, satting 100% on

## 2019-12-12 NOTE — PLAN OF CARE
Problem: Patient/Family Goals  Discharge planning in progress  [facility]ONICA Placement in progress  PT evaluation in progress  Goal: Patient/Family Long Term Goal  stable  Description  Patient's Long Term Goal: back to baseline    Interventions:  - treat infection edema, trend weights  Outcome: Progressing  Goal: Absence of cardiac arrhythmias or at baseline  stable  Description  INTERVENTIONS:  - Continuous cardiac monitoring, monitor vital signs, obtain 12 lead EKG if indicated  - Evaluate effectiveness of antiarr Progressing     Problem: GASTROINTESTINAL - ADULT  Denies nausea vomiting or diarrhea,  BS stable, D10 discontinued.  QID accu-checks  Goal: Maintains or returns to baseline bowel function  Description  INTERVENTIONS:  - Assess bowel function  - Maintain ad care algorithm/standards of care as needed  Outcome: Progressing     Problem: MUSCULOSKELETAL - ADULT  PT evaluation pending.  Moving all extremites  States he feels fatigued after dialysis, requested to get out of bed to chair for lunch  Goal: Return mobil Educate and encourage patient/family in tolerated functional activity level and precautions during self-care     Outcome: Progressing     Problem: Diabetes/Glucose Control  Accu-checks QID, BS stable, appetite improved today, ate breakfast, will continue t

## 2019-12-12 NOTE — PHYSICAL THERAPY NOTE
PHYSICAL THERAPY TREATMENT NOTE - INPATIENT    Room Number: 2682/7794-L     Session: 1   Number of Visits to Meet Established Goals: 5    Presenting Problem: Acute colitis; Respiratory Failure; Sepsis;  Acute encephalopathy; Ischemia colitis    Problem Lis blankets)?: A Little   -   Sitting down on and standing up from a chair with arms (e.g., wheelchair, bedside commode, etc.): A Little   -   Moving from lying on back to sitting on the side of the bed?: A Little   How much help from another person does the today due to dizziness and weakness in standing. Pt had HD this morning. At this time, pt presents with decreased strength, dec endurance, dec balance and remains decline in functional mobility.     was notified during session that his evalua

## 2019-12-12 NOTE — PROGRESS NOTES
[facility]  Cardiology Progress Note    Subjective:  No chest pain or shortness of breath. Seems more alert this afternoon. Per RN, he is eating fairly well. Denies any complaints of pain.       Objective:  /67 (BP Location: Right arm)   Pulse 1mg PO.   INR now 3.97.   · Hx thoracoabdominal aortic aneurysm , urgent repair 2003, redo repair 2015  · Hx COPD  · Hx NSVT, declines ICD in past  · Hypercapnic respiratory failure, requiring bipap  · TME, associated mental status changes, no acute changes

## 2019-12-13 VITALS
HEART RATE: 90 BPM | DIASTOLIC BLOOD PRESSURE: 52 MMHG | WEIGHT: 149.25 LBS | OXYGEN SATURATION: 99 % | SYSTOLIC BLOOD PRESSURE: 105 MMHG | BODY MASS INDEX: 19 KG/M2 | TEMPERATURE: 99 F | RESPIRATION RATE: 18 BRPM

## 2019-12-13 PROCEDURE — 99232 SBSQ HOSP IP/OBS MODERATE 35: CPT | Performed by: INTERNAL MEDICINE

## 2019-12-13 PROCEDURE — 99239 HOSP IP/OBS DSCHRG MGMT >30: CPT | Performed by: HOSPITALIST

## 2019-12-13 RX ORDER — MIDODRINE HYDROCHLORIDE 5 MG/1
10 TABLET ORAL
Status: DISCONTINUED | OUTPATIENT
Start: 2019-12-14 | End: 2019-12-13

## 2019-12-13 RX ORDER — ALBUMIN (HUMAN) 12.5 G/50ML
100 SOLUTION INTRAVENOUS AS NEEDED
Status: DISCONTINUED | OUTPATIENT
Start: 2019-12-13 | End: 2019-12-13

## 2019-12-13 RX ORDER — LIDOCAINE AND PRILOCAINE 25; 25 MG/G; MG/G
CREAM TOPICAL
Status: DISCONTINUED | OUTPATIENT
Start: 2019-12-14 | End: 2019-12-13

## 2019-12-13 RX ORDER — WARFARIN SODIUM 2 MG/1
2 TABLET ORAL NIGHTLY
Qty: 30 TABLET | Refills: 5 | Status: SHIPPED | OUTPATIENT
Start: 2019-12-13

## 2019-12-13 RX ORDER — WARFARIN SODIUM 2 MG/1
2 TABLET ORAL NIGHTLY
Status: DISCONTINUED | OUTPATIENT
Start: 2019-12-13 | End: 2019-12-13

## 2019-12-13 NOTE — PLAN OF CARE
Received pt at 0700. Refused bipap last night, turned off O2 this am, sating 90-94% on room air. Afib on telemetry, denies pain, refused some morning medications. Waiting for placement, will continue to monitor.        Problem: Patient/Family Goals  Goal: P baseline  Description  INTERVENTIONS:  - Continuous cardiac monitoring, monitor vital signs, obtain 12 lead EKG if indicated  - Evaluate effectiveness of antiarrhythmic and heart rate control medications as ordered  - Initiate emergency measures for life t ordered and tolerated  - Evaluate effectiveness of GI medications  - Encourage mobilization and activity  - Obtain nutritional consult as needed  - Establish a toileting routine/schedule  - Consider collaborating with pharmacy to review patient's medicatio wounds/incisions during mobilization  - Obtain PT/OT consults as needed  - Advance activity as appropriate  - Communicate ordered activity level and limitations with patient/family  Outcome: Progressing     Problem: NEUROLOGICAL - ADULT  Goal: Absence of s activity/tolerance for mobility and gait  - Educate and engage patient/family in tolerated activity level and precautions  - Recommend use of  RW for transfers and ambulation   Outcome: Progressing

## 2019-12-13 NOTE — PROGRESS NOTES
[facility]  Nephrology Progress Note    Oli Tremichelle Fowler.  Patient Status:  Inpatient    1945 MRN [id number]   [facility] 8NE-A Attending Mckenzie Haider MD   Hosp Day # 15 PCP VEE SCHUSTER       SUBJECTIVE:  Up in chair, stable t --   --    HGB 11.4* 11.6*  --  12.3* 13.4 12.3*  --   --    MCV 95.6 95.7  --  97.2 101.7* 94.9  --   --    .0 175.0  --  198.0 197.0 223.0  --   --    BAND  --   --   --   --  1  --   --   --    INR 1.46* 1.56* 1.86*  --  2.85* 3.96* 3.97* 2.76* puff, 1 puff, Inhalation, Daily  sevelamer (RENVELA) tab 800 mg, 2,400 mg, Oral, TID CC  acetaminophen (TYLENOL) tab 650 mg, 650 mg, Oral, Q6H PRN  ondansetron HCl (ZOFRAN) injection 4 mg, 4 mg, Intravenous, Q4H PRN  influenza vaccine (PF)(FLUZONE HD) high

## 2019-12-13 NOTE — CM/SW NOTE
Pt medically cleared for discharge today to [facility] with in-house HD, Tomas. [facility] [phone] with Dr Chyna Patel, pt will not need IV albumin with HD, pt can be discharged to [facility].  Await f/u call

## 2019-12-13 NOTE — CM/SW NOTE
SW met with Pt's daughter and son to confirm plans for d/c today to [facility]. Scheduled ambulance transport for 6:30pm. Unit RN to call with nurse report.    [place] [id number]   Yohana Samiajshirleyfrank 1579 [phone]

## 2019-12-13 NOTE — PROGRESS NOTES
Pulmonary Progress Note        NAME: Mauri Terry. - ROOM: [address] [place] [address] - MRN: [id number] - Age: 69year old - : 1945        Last 24hrs: No events overnight, much more awake and alert this AM than the past few times I've seen him    OBJECTIVE: Recent Labs     12/11/19  0653   *   K 3.6   CL 96*   CO2 24.0   BUN 31*   CA 9.7       Recent Labs     12/11/19 0653   ALT 43   AST 34   ALB 3.4       Invalid input(s): ARTERIALPH, ARTERIALPO2, ARTERIALPCO2, ARTERIALHCO3    No results for inp

## 2019-12-13 NOTE — CM/SW NOTE
SW called and left message for pt's daughter regarding choice of [facility]. She is deciding between [facility] or [facility], will follow. Call back from daughter, discussed d/c options again.  She is quite unhappy with all of the poor ratings at t

## 2019-12-13 NOTE — PLAN OF CARE
12/12/2019      No changes from day shift report. Pt still continues to refuse his meds and frequently wants to sit on the commode. Pt has no complaints at this time.     POC: Awaiting placement- family to make a decision    Problem: Patient/Family Goals  D venous puncture sites for bleeding and/or hematoma  - Assess quality of pulses, skin color and temperature  - Assess for signs of decreased coronary artery perfusion - ex.  Angina  - Evaluate fluid balance, assess for edema, trend weights  Outcome: Progress on assessment  - Modify environment to reduce risk of injury  - Provide assistive devices as appropriate  - Consider OT/PT consult to assist with strengthening/mobility  - Encourage toileting schedule  Outcome: Progressing     Problem: GASTROINTESTINAL - A intact  Description  INTERVENTIONS  - Assess and document risk factors for pressure ulcer development  - Assess and document skin integrity  - Monitor for areas of redness and/or skin breakdown  - Initiate interventions, skin care algorithm/standards of ca independence in self care  Description  Interventions:  - Assess ability and encourage patient to participate in ADLs to maximize function  - Promote sitting position while performing ADLs such as feeding, grooming, and bathing  - Educate and encourage pat

## 2019-12-13 NOTE — PROGRESS NOTES
[facility]  Progress Note     Oli Gallardo.  Patient Status:  Inpatient    1945 MRN [id number]   [facility] 4SW-A Attending Avel Bernstein [facility] Day # 15 PCP Laura Russell     Chief Complaint: Colitis, AF    S: Lab 12/11/19  0653 12/12/19  0643 12/13/19  0614   PTP 41.6* 41.7* 31.0*   INR 3.96* 3.97* 2.76*       No results for input(s): TROP, CK in the last 168 hours. Imaging: Imaging data reviewed in Epic.     Medications:   • Warfarin Sodium  2 mg Oral Nig discharge?: Yes  Estimated date of discharge: Today  Discharge is dependent on: Clinical course  At this point Mr. Pedrito Keenan is expected to be discharge to: SNF    Plan of care discussed with patient and RN. DC to SNF once arrangements in place.   He is stabl

## 2019-12-13 NOTE — SLP NOTE
ADULT SWALLOWING EVALUATION    ASSESSMENT    ASSESSMENT/OVERALL IMPRESSION:  Orders were received for a bedside swallowing evaluation as patient had been placed on a puree diet while an inpatient.  RN requested evaluation to determine if patient is safe for unspecified    • Hemoperitoneum (nontraumatic)    • History of AAA (abdominal aortic aneurysm) repair    • Other and unspecified hyperlipidemia    • Renal disorder    • Seizure disorder [facility])    • Unspecified essential hypertension        Prior Living Situ

## 2019-12-13 NOTE — PROGRESS NOTES
· [facility]      Subjective:  Up in chair denies dyspnea - tolerated breakfast with abdominal tightness but no nausea    Objective:  95/60  AFib 90s-low 100s  Afebrile  I/O  Incomplete  INR 2.76    Neuro: awake/alert  HEENT: mild JVD  Cardiac

## 2019-12-14 NOTE — PROGRESS NOTES
No changes in assessment findings since previous RN's documented assessment. Tele & one iv site removed (catheter intact). Discharge paperwork printed and placed in envelop for ambulance transporters.       Pt's daughter, Oneida Rosenbaum, and son at bedside; up

## 2019-12-14 NOTE — DISCHARGE SUMMARY
[facility]  DISCHARGE SUMMARY     Oli Wells.  Patient Status:  Inpatient    1945 MRN [id number]   [facility] 2NE-A Attending No att. providers found   Hosp Day # 13 [facility] [address] [facility]     Date of Admission: 2019  Da antibiotic therapy, PRN BiPAP. Slowly he improved, his oral diet continued to improve, he was seen by PT and recommended for subacute rehabilitation. He has been cleared for discharge.     Lace+ Score: 56  59-90 High Risk  29-58 Medium Risk  0-28   Low Ri known as:  FOLVITE      Take 1 mg by mouth daily. Refills:  0     ipratropium-albuterol 0.5-2.5 (3) MG/3ML Soln  Commonly known as:  DUONEB      Take 3 mL by nebulization every 6 (six) hours as needed.    Refills:  0     Sevelamer Carbonate 800 MG Tabs  C No acute distress. Respiratory: Clear to auscultation bilaterally. No wheezes. No rhonchi. Cardiovascular: S1, S2. Regular rate and rhythm. No murmurs, rubs or gallops. Abdomen: Soft, nontender, nondistended. Positive bowel sounds.  No rebound or guar

## 2019-12-14 NOTE — CM/SW NOTE
12/13/19 1800   Discharge disposition   Expected discharge disposition Skilled Nurs   Name of Facillity/Home Care/Hospice   ([facility])   Discharge transportation [employer]

## 2020-01-01 ENCOUNTER — CASE MANAGEMENT (OUTPATIENT)
Dept: CARE COORDINATION | Age: 75
End: 2020-01-01

## 2020-01-01 ENCOUNTER — DIAGNOSTIC TRANS (OUTPATIENT)
Dept: OTHER | Age: 75
End: 2020-01-01

## 2020-01-01 ENCOUNTER — HOSPITAL (OUTPATIENT)
Dept: OTHER | Age: 75
End: 2020-01-01

## 2020-01-01 ENCOUNTER — OFFICE VISIT (OUTPATIENT)
Dept: CARDIOLOGY | Age: 75
End: 2020-01-01

## 2020-01-01 ENCOUNTER — CLINICAL ABSTRACT (OUTPATIENT)
Dept: CARDIOLOGY | Age: 75
End: 2020-01-01

## 2020-01-01 ENCOUNTER — EXTERNAL RECORD (OUTPATIENT)
Dept: HEALTH INFORMATION MANAGEMENT | Facility: OTHER | Age: 75
End: 2020-01-01

## 2020-01-01 ENCOUNTER — CLINICAL ABSTRACT (OUTPATIENT)
Dept: OTHER | Age: 75
End: 2020-01-01

## 2020-01-01 VITALS — SYSTOLIC BLOOD PRESSURE: 100 MMHG | HEART RATE: 68 BPM | DIASTOLIC BLOOD PRESSURE: 60 MMHG

## 2020-01-01 DIAGNOSIS — I42.0 NONISCHEMIC DILATED CARDIOMYOPATHY (CMD): ICD-10-CM

## 2020-01-01 DIAGNOSIS — Z95.2 HX OF MECHANICAL AORTIC VALVE REPLACEMENT: Primary | ICD-10-CM

## 2020-01-01 DIAGNOSIS — L97.511 ULCERATED, FOOT, RIGHT, LIMITED TO BREAKDOWN OF SKIN (CMD): ICD-10-CM

## 2020-01-01 DIAGNOSIS — I71.03 DISSECTION OF THORACOABDOMINAL AORTA (CMD): ICD-10-CM

## 2020-01-01 LAB
ABSOLUTE IMMATURE GRANULOCYTES (OFFPRE24): NORMAL
ALBUMIN SERPL-MCNC: 2.3 G/DL (ref 3.6–5.1)
ALBUMIN SERPL-MCNC: 2.8 G/DL (ref 3.6–5.1)
ALBUMIN SERPL-MCNC: 3 G/DL (ref 3.6–5.1)
ALBUMIN SERPL-MCNC: 4 G/DL
ALBUMIN/GLOB SERPL: 0.8 {RATIO} (ref 1–2.4)
ALBUMIN/GLOB SERPL: NORMAL {RATIO}
ALP SERPL-CCNC: 202 UNITS/L (ref 45–117)
ALP SERPL-CCNC: 65 U/L
ALP SERPL-CCNC: 82 UNITS/L (ref 45–117)
ALP SERPL-CCNC: 82 UNITS/L (ref 45–117)
ALT SERPL-CCNC: 17 U/L
ALT SERPL-CCNC: 27 UNITS/L
ALT SERPL-CCNC: 3414 UNITS/L
ALT SERPL-CCNC: 47 UNITS/L
ALT SERPL-CCNC: ABNORMAL U/L
ANALYZER ANC (IANC): ABNORMAL
ANION GAP SERPL CALC-SCNC: 18 MMOL/L (ref 10–20)
ANION GAP SERPL CALC-SCNC: 20 MMOL/L (ref 10–20)
ANION GAP SERPL CALC-SCNC: 22 MMOL/L (ref 10–20)
ANION GAP SERPL CALC-SCNC: 28 MMOL/L (ref 10–20)
ANION GAP SERPL CALC-SCNC: 30 MMOL/L (ref 10–20)
ANION GAP SERPL CALC-SCNC: NORMAL MMOL/L
APTT PPP: 101 SEC (ref 22–32)
APTT PPP: ABNORMAL S
AST SERPL-CCNC: 19 U/L
AST SERPL-CCNC: 26 UNITS/L
AST SERPL-CCNC: 78 UNITS/L
AST SERPL-CCNC: 9656 UNITS/L
AST SERPL-CCNC: ABNORMAL U/L
BASE DEFICIT BLDA-SCNC: 1 MMOL/L (ref 0–2)
BASE DEFICIT BLDA-SCNC: 14 MMOL/L (ref 0–2)
BASE DEFICIT BLDA-SCNC: 16 MMOL/L (ref 0–2)
BASE DEFICIT BLDA-SCNC: 16 MMOL/L (ref 0–2)
BASE DEFICIT BLDA-SCNC: 18 MMOL/L (ref 0–2)
BASE DEFICIT BLDA-SCNC: 2 MMOL/L (ref 0–2)
BASE DEFICIT BLDA-SCNC: 5 MMOL/L (ref 0–2)
BASE DEFICIT BLDMV-SCNC: 3 MMOL/L
BASE DEFICIT BLDV-SCNC: 15 MMOL/L (ref 0–2)
BASE DEFICIT BLDV-SCNC: 17 MMOL/L (ref 0–2)
BASE EXCESS BLDA CALC-SCNC: ABNORMAL MMOL/L
BASE EXCESS-RC: ABNORMAL
BASO+EOS+MONOS # BLD: NORMAL 10*3/UL
BASO+EOS+MONOS NFR BLD: NORMAL %
BASOPHILS # BLD: 0 K/MCL (ref 0–0.3)
BASOPHILS # BLD: 0 K/MCL (ref 0–0.3)
BASOPHILS # BLD: NORMAL 10*3/UL
BASOPHILS NFR BLD: 0 %
BASOPHILS NFR BLD: 0 %
BASOPHILS NFR BLD: NORMAL %
BDY SITE: ABNORMAL
BILIRUB SERPL-MCNC: 1.07 MG/DL
BILIRUB SERPL-MCNC: 1.5 MG/DL (ref 0.2–1)
BILIRUB SERPL-MCNC: 1.9 MG/DL (ref 0.2–1)
BILIRUB SERPL-MCNC: 3.8 MG/DL (ref 0.2–1)
BODY TEMPERATURE: 35.8 DEGREES
BODY TEMPERATURE: 35.8 DEGREES
BODY TEMPERATURE: 36 DEGREES
BODY TEMPERATURE: 36.1 DEGREES
BODY TEMPERATURE: 37 DEGREES
BUN SERPL-MCNC: 19 MG/DL (ref 6–20)
BUN SERPL-MCNC: 25 MG/DL (ref 6–20)
BUN SERPL-MCNC: 26 MG/DL (ref 6–20)
BUN SERPL-MCNC: 41 MG/DL (ref 6–20)
BUN SERPL-MCNC: 42 MG/DL (ref 6–20)
BUN SERPL-MCNC: 48 MG/DL
BUN/CREAT SERPL: 5 (ref 7–25)
BUN/CREAT SERPL: 6 (ref 7–25)
BUN/CREAT SERPL: NORMAL
BURR CELLS (BURC): ABNORMAL
BURR CELLS (BURC): ABNORMAL
CA-I BLD ISE-SCNC: 1.48 MMOL/L (ref 1.15–1.29)
CA-I BLD ISE-SCNC: 1.67 MMOL/L (ref 1.15–1.29)
CA-I BLDA-SCNC: 14 % (ref 15–23)
CA-I BLDA-SCNC: 16 % (ref 15–23)
CALCIUM SERPL-MCNC: 10 MG/DL
CALCIUM SERPL-MCNC: 10.7 MG/DL (ref 8.4–10.2)
CALCIUM SERPL-MCNC: 7.8 MG/DL (ref 8.4–10.2)
CALCIUM SERPL-MCNC: 7.8 MG/DL (ref 8.4–10.2)
CALCIUM SERPL-MCNC: 8 MG/DL (ref 8.4–10.2)
CALCIUM SERPL-MCNC: 8.8 MG/DL (ref 8.4–10.2)
CHLORIDE BLD-SCNC: 95 MMOL/L (ref 98–107)
CHLORIDE BLD-SCNC: 95 MMOL/L (ref 98–107)
CHLORIDE SERPL-SCNC: 92 MMOL/L
CHLORIDE SERPL-SCNC: 94 MMOL/L (ref 98–107)
CHLORIDE SERPL-SCNC: 97 MMOL/L (ref 98–107)
CHLORIDE SERPL-SCNC: 98 MMOL/L (ref 98–107)
CMV DNA CSF QL NAA+NON-PROBE: 10 %
CO2 SERPL-SCNC: 10 MMOL/L (ref 21–32)
CO2 SERPL-SCNC: 12 MMOL/L (ref 21–32)
CO2 SERPL-SCNC: 18 MMOL/L (ref 21–32)
CO2 SERPL-SCNC: 20 MMOL/L (ref 21–32)
CO2 SERPL-SCNC: 24 MMOL/L (ref 21–32)
CO2 SERPL-SCNC: NORMAL MMOL/L
COHGB MFR BLD: 1.7 %
COHGB MFR BLD: 1.8 %
COHGB MFR BLD: 1.9 %
COHGB MFR BLD: 2 %
COHGB MFR BLD: 2.1 %
COHGB MFR BLD: 2.3 %
COHGB MFR BLDMV: 1.7 %
CONDITION-RC: ABNORMAL
CONDITION: ABNORMAL
CREAT SERPL-MCNC: 3.82 MG/DL (ref 0.67–1.17)
CREAT SERPL-MCNC: 4.21 MG/DL (ref 0.67–1.17)
CREAT SERPL-MCNC: 4.37 MG/DL (ref 0.67–1.17)
CREAT SERPL-MCNC: 6.52 MG/DL (ref 0.67–1.17)
CREAT SERPL-MCNC: 6.69 MG/DL (ref 0.67–1.17)
CREAT SERPL-MCNC: 6.8 MG/DL
DIFFERENTIAL METHOD BLD: ABNORMAL
DIFFERENTIAL METHOD BLD: ABNORMAL
DIFFERENTIAL METHOD BLD: NORMAL
EOSINOPHIL # BLD: 0 K/MCL (ref 0.1–0.5)
EOSINOPHIL # BLD: 0 K/MCL (ref 0.1–0.5)
EOSINOPHIL # BLD: NORMAL 10*3/UL
EOSINOPHIL NFR BLD: 0 %
EOSINOPHIL NFR BLD: 0 %
EOSINOPHIL NFR BLD: NORMAL %
ERYTHROCYTE [DISTWIDTH] IN BLOOD: 17.7 % (ref 11–15)
ERYTHROCYTE [DISTWIDTH] IN BLOOD: 18 % (ref 11–15)
ERYTHROCYTE [DISTWIDTH] IN BLOOD: 18.4 % (ref 11–15)
ERYTHROCYTE [DISTWIDTH] IN BLOOD: NORMAL %
FIBRINOGEN PPP-MCNC: 134 MG/DL (ref 190–425)
FIBRINOGEN PPP-MCNC: 98 MG/DL (ref 190–425)
GLOBULIN SER-MCNC: 2.9 G/DL (ref 2–4)
GLOBULIN SER-MCNC: 3.6 G/DL (ref 2–4)
GLOBULIN SER-MCNC: 3.9 G/DL (ref 2–4)
GLOBULIN SER-MCNC: NORMAL G/DL
GLUCOSE BLD-MCNC: 31 MG/DL (ref 65–99)
GLUCOSE BLD-MCNC: <25 MG/DL (ref 65–99)
GLUCOSE BLDC GLUCOMTR-MCNC: 106 MG/DL (ref 70–99)
GLUCOSE BLDC GLUCOMTR-MCNC: 110 MG/DL (ref 70–99)
GLUCOSE BLDC GLUCOMTR-MCNC: 116 MG/DL (ref 70–99)
GLUCOSE BLDC GLUCOMTR-MCNC: 131 MG/DL (ref 70–99)
GLUCOSE BLDC GLUCOMTR-MCNC: 131 MG/DL (ref 70–99)
GLUCOSE BLDC GLUCOMTR-MCNC: 134 MG/DL (ref 70–99)
GLUCOSE BLDC GLUCOMTR-MCNC: 14 MG/DL (ref 70–99)
GLUCOSE BLDC GLUCOMTR-MCNC: 202 MG/DL (ref 70–99)
GLUCOSE BLDC GLUCOMTR-MCNC: 48 MG/DL (ref 70–99)
GLUCOSE BLDC GLUCOMTR-MCNC: 58 MG/DL (ref 70–99)
GLUCOSE BLDC GLUCOMTR-MCNC: 60 MG/DL (ref 70–99)
GLUCOSE BLDC GLUCOMTR-MCNC: 69 MG/DL (ref 70–99)
GLUCOSE BLDC GLUCOMTR-MCNC: 74 MG/DL (ref 70–99)
GLUCOSE BLDC GLUCOMTR-MCNC: 79 MG/DL (ref 70–99)
GLUCOSE BLDC GLUCOMTR-MCNC: 79 MG/DL (ref 70–99)
GLUCOSE BLDC GLUCOMTR-MCNC: 80 MG/DL (ref 70–99)
GLUCOSE BLDC GLUCOMTR-MCNC: 80 MG/DL (ref 70–99)
GLUCOSE BLDC GLUCOMTR-MCNC: 83 MG/DL (ref 70–99)
GLUCOSE BLDC GLUCOMTR-MCNC: 87 MG/DL (ref 70–99)
GLUCOSE BLDC GLUCOMTR-MCNC: 99 MG/DL (ref 70–99)
GLUCOSE BLDC GLUCOMTR-MCNC: ABNORMAL MG/DL
GLUCOSE BLDC GLUCOMTR-MCNC: NORMAL MG/DL
GLUCOSE SERPL-MCNC: 10 MG/DL (ref 65–99)
GLUCOSE SERPL-MCNC: 113 MG/DL (ref 65–99)
GLUCOSE SERPL-MCNC: 131 MG/DL (ref 65–99)
GLUCOSE SERPL-MCNC: 56 MG/DL (ref 65–99)
GLUCOSE SERPL-MCNC: 63 MG/DL (ref 65–99)
GLUCOSE SERPL-MCNC: 74 MG/DL
GLUCOSE SERPL-MCNC: ABNORMAL MG/DL
HCO3 BLDA-SCNC: 12 MMOL/L (ref 22–28)
HCO3 BLDA-SCNC: 13 MMOL/L (ref 22–28)
HCO3 BLDA-SCNC: 13 MMOL/L (ref 22–28)
HCO3 BLDA-SCNC: 16 MMOL/L (ref 22–28)
HCO3 BLDA-SCNC: 23 MMOL/L (ref 22–28)
HCO3 BLDA-SCNC: 27 MMOL/L (ref 22–28)
HCO3 BLDA-SCNC: 28 MMOL/L (ref 22–28)
HCO3 BLDMV-SCNC: 27 MMOL/L
HCO3 BLDV-SCNC: 14 MMOL/L (ref 22–28)
HCO3 BLDV-SCNC: 15 MMOL/L (ref 22–28)
HCT VFR BLD CALC: 32 % (ref 39–51)
HCT VFR BLD CALC: 33 % (ref 39–51)
HCT VFR BLD CALC: 34 % (ref 39–51)
HCT VFR BLD CALC: 34.2 % (ref 39–51)
HCT VFR BLD CALC: 34.9 % (ref 39–51)
HCT VFR BLD CALC: 35 % (ref 39–51)
HCT VFR BLD CALC: 35.5 % (ref 39–51)
HCT VFR BLD CALC: 35.8 %
HGB BLD-MCNC: 10.5 G/DL (ref 13–17)
HGB BLD-MCNC: 10.6 G/DL (ref 13–17)
HGB BLD-MCNC: 10.8 G/DL (ref 13–17)
HGB BLD-MCNC: 10.9 G/DL (ref 13–17)
HGB BLD-MCNC: 11 G/DL (ref 13–17)
HGB BLD-MCNC: 11.2 G/DL (ref 13–17)
HGB BLD-MCNC: 11.4 G/DL (ref 13–17)
HGB BLD-MCNC: 11.5 G/DL
HGB BLD-MCNC: 11.5 G/DL (ref 13–17)
HOROWITZ INDEX BLD+IHG-RTO: 10 %
HOROWITZ INDEX BLD+IHG-RTO: 100 %
HOROWITZ INDEX BLD+IHG-RTO: 70 %
HOROWITZ INDEX BLD+IHG-RTO: 90 %
HYPOCHROMIA (HYPOC): ABNORMAL
IMMATURE GRANULOCYTES (OFFPRE25): NORMAL
INR PPP: 14
INR PPP: 17.4
INR PPP: 4.2
INR PPP: ABNORMAL
LACTATE BLDA-MCNC: 11.8 MMOL/L
LACTATE BLDA-MCNC: 12.6 MMOL/L
LACTATE BLDA-MCNC: 12.8 MMOL/L
LACTATE BLDA-MCNC: 5.4 MMOL/L
LACTATE BLDA-MCNC: 7 MMOL/L
LACTATE BLDA-MCNC: >14 MMOL/L
LACTATE BLDV-SCNC: 5.6 MMOL/L (ref 0–2)
LACTATE BLDV-SCNC: 6.9 MMOL/L (ref 0–2)
LACTATE BLDV-SCNC: ABNORMAL MMOL/L
LACTATE BLDV-SCNC: ABNORMAL MMOL/L
LENGTH OF FAST TIME PATIENT: NORMAL H
LYMPHOCYTES # BLD: 0.9 K/MCL (ref 1–4)
LYMPHOCYTES # BLD: 1.1 K/MCL (ref 1–4)
LYMPHOCYTES # BLD: NORMAL 10*3/UL
LYMPHOCYTES NFR BLD: 4 %
LYMPHOCYTES NFR BLD: 9 %
LYMPHOCYTES NFR BLD: NORMAL %
MAGNESIUM SERPL-MCNC: 1.9 MG/DL (ref 1.7–2.4)
MAGNESIUM SERPL-MCNC: 2.3 MG/DL (ref 1.7–2.4)
MAGNESIUM SERPL-MCNC: 2.4 MG/DL (ref 1.7–2.4)
MAGNESIUM SERPL-MCNC: NORMAL MG/DL
MCH RBC QN AUTO: 30.3 PG (ref 26–34)
MCH RBC QN AUTO: 30.6 PG (ref 26–34)
MCH RBC QN AUTO: 30.9 PG (ref 26–34)
MCH RBC QN AUTO: NORMAL PG
MCHC RBC AUTO-ENTMCNC: 29.6 G/DL (ref 32–36.5)
MCHC RBC AUTO-ENTMCNC: 31.6 G/DL (ref 32–36.5)
MCHC RBC AUTO-ENTMCNC: 32.1 G/DL (ref 32–36.5)
MCHC RBC AUTO-ENTMCNC: NORMAL G/DL
MCV RBC AUTO: 104.4 FL (ref 78–100)
MCV RBC AUTO: 95.4 FL (ref 78–100)
MCV RBC AUTO: 95.8 FL (ref 78–100)
MCV RBC AUTO: NORMAL FL
METHGB MFR BLD: 0.1 %
METHGB MFR BLD: 0.6 %
METHGB MFR BLD: 0.8 %
METHGB MFR BLD: 0.9 %
METHGB MFR BLD: 0.9 %
MONOCYTES # BLD: 0.1 K/MCL (ref 0.3–0.9)
MONOCYTES # BLD: 0.7 K/MCL (ref 0.3–0.9)
MONOCYTES # BLD: NORMAL 10*3/UL
MONOCYTES NFR BLD: 1 %
MONOCYTES NFR BLD: 3 %
MONOCYTES NFR BLD: NORMAL %
MPV (OFFPRE2): NORMAL
MRSA DNA SPEC QL NAA+PROBE: ABNORMAL
MRSA DNA SPEC QL NAA+PROBE: DETECTED
NEUTROPHILS # BLD: 20.9 K/MCL (ref 1.8–7.7)
NEUTROPHILS # BLD: 8.6 K/MCL (ref 1.8–7.7)
NEUTROPHILS # BLD: NORMAL 10*3/UL
NEUTROPHILS NFR BLD: NORMAL %
NEUTS BAND NFR BLD: 10 % (ref 0–10)
NEUTS BAND NFR BLD: 2 % (ref 0–10)
NEUTS SEG NFR BLD: 80 %
NEUTS SEG NFR BLD: 90 %
NRBC (NRBCRE): 13 /100 WBC
NRBC (NRBCRE): 2 /100 WBC
NRBC (NRBCRE): 2 /100 WBC
NRBC BLD MANUAL-RTO: NORMAL %
OVALOCYTES (OVALO): ABNORMAL
OVALOCYTES (OVALO): ABNORMAL
OXYHGB MFR BLD: 74.2 % (ref 94–98)
OXYHGB MFR BLD: 77 % (ref 94–98)
OXYHGB MFR BLD: 87.3 % (ref 94–98)
OXYHGB MFR BLD: 91.2 % (ref 94–98)
OXYHGB MFR BLD: 94.1 % (ref 94–98)
OXYHGB MFR BLD: 97.7 % (ref 94–98)
OXYHGB MFR BLDMV: 60.1 %
PAO2 / FIO2 RATIO (RPFR): 106 (ref 300–500)
PAO2 / FIO2 RATIO (RPFR): 193 (ref 300–500)
PAO2 / FIO2 RATIO (RPFR): 248 (ref 300–500)
PAO2 / FIO2 RATIO (RPFR): 287 (ref 300–500)
PAO2 / FIO2 RATIO (RPFR): 66 (ref 300–500)
PAO2 / FIO2 RATIO (RPFR): 69 (ref 300–500)
PAO2 / FIO2 RATIO (RPFR): 88 (ref 300–500)
PAO2 / FIO2 RATIO (RPFR): ABNORMAL
PATH REV BLD -IMP: ABNORMAL
PATH REV BLD -IMP: ABNORMAL
PCO2 BLDA: 38 MM HG (ref 35–48)
PCO2 BLDA: 42 MM HG (ref 35–48)
PCO2 BLDA: 43 MM HG (ref 35–48)
PCO2 BLDA: 52 MM HG (ref 35–48)
PCO2 BLDA: 55 MM HG (ref 35–48)
PCO2 BLDA: 58 MM HG (ref 35–48)
PCO2 BLDA: 69 MM HG (ref 35–48)
PCO2 BLDMV: 72 MM HG
PCO2 BLDV: 47 MM HG (ref 41–54)
PCO2 BLDV: 50 MM HG (ref 41–54)
PH BLDA: 7.07 UNITS (ref 7.35–7.45)
PH BLDA: 7.09 UNITS (ref 7.35–7.45)
PH BLDA: 7.1 UNITS (ref 7.35–7.45)
PH BLDA: 7.1 UNITS (ref 7.35–7.45)
PH BLDA: 7.21 UNITS (ref 7.35–7.45)
PH BLDA: 7.25 UNITS (ref 7.35–7.45)
PH BLDA: 7.27 UNITS (ref 7.35–7.45)
PH BLDMV: 7.18 UNITS
PH BLDV: 7.05 UNITS (ref 7.35–7.45)
PH BLDV: 7.08 UNITS (ref 7.35–7.45)
PHOSPHATE SERPL-MCNC: 4.6 MG/DL (ref 2.4–4.7)
PHOSPHATE SERPL-MCNC: 5 MG/DL (ref 2.4–4.7)
PLAT MORPH BLD: NORMAL
PLATELET # BLD: 135 10*3/UL
PLATELET # BLD: 50 K/MCL (ref 140–450)
PLATELET # BLD: 82 K/MCL (ref 140–450)
PLATELET # BLD: 93 K/MCL (ref 140–450)
PLATELET # BLD: ABNORMAL 10*3/UL
PO2 BLDA: 106 MM HG (ref 83–108)
PO2 BLDA: 129 MM HG (ref 83–108)
PO2 BLDA: 195 MM HG (ref 83–108)
PO2 BLDA: 218 MM HG (ref 83–108)
PO2 BLDA: 66 MM HG (ref 83–108)
PO2 BLDA: 69 MM HG (ref 83–108)
PO2 BLDA: 83 MM HG (ref 83–108)
PO2 BLDMV: 43 MM HG
PO2 BLDV: 46 MM HG (ref 35–42)
PO2 BLDV: 48 MM HG (ref 35–42)
POLYCHROMASIA (POLY): ABNORMAL
POLYCHROMASIA (POLY): ABNORMAL
POTASSIUM BLD-SCNC: 4.5 MMOL/L (ref 3.4–5.1)
POTASSIUM BLD-SCNC: 4.6 MMOL/L (ref 3.4–5.1)
POTASSIUM SERPL-SCNC: 4.5 MMOL/L (ref 3.4–5.1)
POTASSIUM SERPL-SCNC: 4.5 MMOL/L (ref 3.4–5.1)
POTASSIUM SERPL-SCNC: 4.8 MMOL/L (ref 3.4–5.1)
POTASSIUM SERPL-SCNC: 4.9 MMOL/L
POTASSIUM SERPL-SCNC: 5 MMOL/L (ref 3.4–5.1)
POTASSIUM SERPL-SCNC: 5.1 MMOL/L (ref 3.4–5.1)
POTASSIUM SERPL-SCNC: 5.2 MMOL/L (ref 3.4–5.1)
POTASSIUM SERPL-SCNC: 5.2 MMOL/L (ref 3.4–5.1)
POTASSIUM SERPL-SCNC: 5.6 MMOL/L (ref 3.4–5.1)
POTASSIUM SERPL-SCNC: 5.9 MMOL/L (ref 3.4–5.1)
POTASSIUM SERPL-SCNC: ABNORMAL MMOL/L
POTASSIUM SERPL-SCNC: NORMAL MMOL/L
PROCALCITONIN SERPL IA-MCNC: <0.05 NG/ML
PROCALCITONIN SERPL IA-MCNC: NORMAL NG/ML
PROT SERPL-MCNC: 5.2 G/DL (ref 6.4–8.2)
PROT SERPL-MCNC: 6.3 G/DL
PROT SERPL-MCNC: 6.4 G/DL (ref 6.4–8.2)
PROT SERPL-MCNC: 6.9 G/DL (ref 6.4–8.2)
PROTHROMBIN TIME (PRT2): ABNORMAL
PROTHROMBIN TIME: 144.8 SEC (ref 9.7–11.8)
PROTHROMBIN TIME: 179.4 SEC (ref 9.7–11.8)
PROTHROMBIN TIME: 44.2 SEC (ref 9.7–11.8)
RBC # BLD: 3.4 MIL/MCL (ref 4.5–5.9)
RBC # BLD: 3.57 MIL/MCL (ref 4.5–5.9)
RBC # BLD: 3.66 MIL/MCL (ref 4.5–5.9)
RBC # BLD: 3.72 10*6/UL
RBC MORPH BLD: NORMAL
SAO2 % BLDA: 100 % (ref 95–99)
SAO2 % BLDA: 90 % (ref 95–99)
SAO2 % BLDA: 93 % (ref 95–99)
SAO2 % BLDA: 96 % (ref 95–99)
SAO2 % BLDA: 97 % (ref 95–99)
SAO2 % BLDA: 98 % (ref 95–99)
SAO2 % BLDA: 99 % (ref 95–99)
SAO2 % BLDMV: 62 %
SAO2 % BLDV: 77 % (ref 60–80)
SAO2 % BLDV: 79 % (ref 60–80)
SERVICE CMNT-IMP: ABNORMAL
SERVICE CMNT-IMP: ABNORMAL
SHISTOCYTES (SHSTO): ABNORMAL
SODIUM BLD-SCNC: 133 MMOL/L (ref 135–145)
SODIUM BLD-SCNC: 133 MMOL/L (ref 135–145)
SODIUM SERPL-SCNC: 128 MMOL/L (ref 135–145)
SODIUM SERPL-SCNC: 131 MMOL/L
SODIUM SERPL-SCNC: 132 MMOL/L (ref 135–145)
SODIUM SERPL-SCNC: 132 MMOL/L (ref 135–145)
SODIUM SERPL-SCNC: 133 MMOL/L (ref 135–145)
SODIUM SERPL-SCNC: 135 MMOL/L (ref 135–145)
SPECIMEN SOURCE: ABNORMAL
TARGET CELLS (TARGET): ABNORMAL
TOXIC VACUOLATION (TOXV): PRESENT
TROPONIN I SERPL HS-MCNC: 0.26 NG/ML
TROPONIN I SERPL HS-MCNC: ABNORMAL NG/L
TROPONIN I SERPL HS-MCNC: ABNORMAL NG/L
VARIANT LYMPHS NFR BLD: 1 % (ref 0–5)
WBC # BLD: 16 K/MCL (ref 4.2–11)
WBC # BLD: 22.7 K/MCL (ref 4.2–11)
WBC # BLD: 6.35 10*3/UL
WBC # BLD: 9.6 K/MCL (ref 4.2–11)
WBC # BLD: ABNORMAL 10*3/UL
WBC # BLD: ABNORMAL 10*3/UL
WBC MORPH BLD: NORMAL
WBC MORPH BLD: NORMAL

## 2020-01-01 PROCEDURE — 99221 1ST HOSP IP/OBS SF/LOW 40: CPT | Performed by: INTERNAL MEDICINE

## 2020-01-01 PROCEDURE — 99233 SBSQ HOSP IP/OBS HIGH 50: CPT | Performed by: INTERNAL MEDICINE

## 2020-01-01 PROCEDURE — 99232 SBSQ HOSP IP/OBS MODERATE 35: CPT | Performed by: INTERNAL MEDICINE

## 2020-01-01 PROCEDURE — 99214 OFFICE O/P EST MOD 30 MIN: CPT | Performed by: INTERNAL MEDICINE

## 2020-01-01 PROCEDURE — 93306 TTE W/DOPPLER COMPLETE: CPT | Performed by: INTERNAL MEDICINE

## 2020-01-01 PROCEDURE — 99223 1ST HOSP IP/OBS HIGH 75: CPT | Performed by: INTERNAL MEDICINE

## 2020-01-01 PROCEDURE — 99239 HOSP IP/OBS DSCHRG MGMT >30: CPT | Performed by: INTERNAL MEDICINE

## 2020-01-01 SDOH — HEALTH STABILITY: PHYSICAL HEALTH: ON AVERAGE, HOW MANY MINUTES DO YOU ENGAGE IN EXERCISE AT THIS LEVEL?: PATIENT DECLINED

## 2020-01-01 SDOH — HEALTH STABILITY: MENTAL HEALTH: HOW OFTEN DO YOU HAVE A DRINK CONTAINING ALCOHOL?: NEVER

## 2020-01-01 ASSESSMENT — PATIENT HEALTH QUESTIONNAIRE - PHQ9
2. FEELING DOWN, DEPRESSED OR HOPELESS: SEVERAL DAYS
1. LITTLE INTEREST OR PLEASURE IN DOING THINGS: SEVERAL DAYS
SUM OF ALL RESPONSES TO PHQ9 QUESTIONS 1 AND 2: 2
SUM OF ALL RESPONSES TO PHQ9 QUESTIONS 1 AND 2: 2

## 2020-01-14 PROBLEM — L97.511: Status: ACTIVE | Noted: 2020-01-01

## 2020-01-25 LAB
BACTERIA BLD CULT: ABNORMAL
ORGANISM: ABNORMAL

## 2020-02-04 ENCOUNTER — CASE MANAGEMENT (OUTPATIENT)
Dept: CARE COORDINATION | Age: 75
End: 2020-02-04

## 2020-03-03 ENCOUNTER — ADVANCED DIRECTIVES (OUTPATIENT)
Dept: HEALTH INFORMATION MANAGEMENT | Age: 75
End: 2020-03-03

## (undated) NOTE — ED AVS SNAPSHOT
Oli Kelley. MRN: [id number]    Department:  [facility] Emergency Department   Date of Visit:  11/28/2019           Disclosure     Insurance plans vary and the physician(s) referred by the ER may not be covered by your plan.  Please contact tell this physician (or your personal doctor if your instructions are to return to your personal doctor) about any new or lasting problems. The primary care or specialist physician will see patients referred from the [facility] Emergency Department.  Wing Ibarra

## (undated) NOTE — IP AVS SNAPSHOT
Patient Demographics     Address  [address] [facility] [address] Phone  [phone] [facility])  [phone] [facility]      Emergency Contact(s)     Name Relation Home Work [employer] [address] Daughter [phone]        Allergies as of 1 Rylee Taveras MD         Sevelamer Carbonate 800 MG Tabs  Commonly known as:  RENVELA      Take 2,400 mg by mouth 3 (three) times daily with meals.           SYMBICORT 160-4.5 MCG/ACT Aero  Generic drug:  Budesonide-Formoterol Fumarate      Inhale 2 puffs Recent Vital Signs       Most Recent Value   Vitals  105/52 Filed at 12/13/2019 1200   Pulse  90 Filed at 12/13/2019 1200   Resp  18 Filed at 12/13/2019 1200   Temp  98.5 °F (36.9 °C) Filed at 12/13/2019 1200   SpO2  99 % Filed at 12/13/2019 1200      [facility] Comment: Please Note: This test no longer includes C.difficile toxin. If clinically indicated order separate test for C.difficile toxin.      Campylobacter Pcr Negative     Plesiomonas Shigelloides Pcr Negative     Salmonella Pcr Negative     Vib Parainlfuenza 3 PCR Negative     Parainlfuenza 4 PCR Negative     Resp Syncytial Virus PCR Negative     Bordetella Pertussis PCR Negative     Chlamydia pneumonia PCR: Negative     Mycoplasma pneumonia PCR: Negative         H&P - H&P Note      H&P signed • Atrial fibrillation (HCC)    • Chronic airway obstruction, not elsewhere classified    • Chronic kidney disease, unspecified    • Hemoperitoneum (nontraumatic)    • History of AAA (abdominal aortic aneurysm) repair    • Other and unspecified hyperlipidem B Complex-Biotin-FA (B COMPLETE OR), Take 1 tablet by mouth daily. , Disp: , Rfl:   calciTRIOL 0.25 MCG Oral Cap, Take 0.25 mcg by mouth daily. , Disp: , Rfl:   Coenzyme Q10 10 MG Oral Cap, Take 10 mg by mouth daily. , Disp: , Rfl:   omega-3 fatty acids 1000 WBC 6.6 13.4*   HGB 12.1* 14.1   MCV 97.9 97.1   PLT 99.0* 219.0   INR 1.92*  --        Recent Labs   Lab 11/28/19  0132 11/30/19  1901   * 161*   BUN 74* 102*   CREATSERUM 8.99* 11.20*   GFRAA 6* 5*   GFRNAA 5* 4*   CA 8.6 8.2*   ALB 3.5 3.2*   NA Author:  Sandy Weber MD Service:  Hospitalist Author Type:  Physician    Filed:  12/1/2019  1:41 AM Date of Service:  12/1/2019  1:07 AM Status:  Signed    :  Sandy Weber MD (Physician)    Related Notes:  Addendum by Sandy Weber MD (Physici • Unspecified essential hypertension         Past Surgical History: History reviewed. No pertinent surgical history. Social History:  reports that he has quit smoking.  He has never used smokeless tobacco.    Family History: Negative for CAD, DMII    All traMADol HCl 50 MG Oral Tab, Take 50 mg by mouth every 6 (six) hours as needed for Pain., Disp: , Rfl:   Lidocaine 4 % External Cream, Apply topically as needed. , Disp: , Rfl:   tamsulosin HCl (FLOMAX) 0.4 MG Oral Cap, Take 0.4 mg by mouth daily. , Disp: , AST 43* 119*   ALT 31 59   BILT 1.1 1.4   TP 7.4 7.4       CrCl cannot be calculated (Unknown ideal weight. ). Recent Labs   Lab 11/28/19  0132   PTP 23.1*   INR 1.92*       No results for input(s): TROP, CK in the last 168 hours.     Imaging: Imaging eloy [id number] [facility]  12/8/2019    11:24 AM      REASON FOR EVALUATION:  Altered mental status - lethargy, confusion     HISTORY OF PRESENT ILLNESS:  Berta Miller is a 69year old male with a co • Aortic valve replaced - mechanical    • Atrial fibrillation (HCC)    • Chronic airway obstruction, not elsewhere classified    • End Stage Renal disease - home hemodialysis    • Hemoperitoneum (nontraumatic)    • Hypertension    • Hyperlipidemia    • [facility] ondansetron HCl (ZOFRAN) injection 4 mg, 4 mg, Intravenous, Q4H PRN  influenza vaccine (PF)(FLUZONE HD) high dose for 65 yrs & older inj 0.5ml, 0.5 mL, Intramuscular, Prior to discharge      REVIEW OF SYSTEMS:  A 10-point system was reviewed.   Pertinent po BUN 33 12/08/2019     12/08/2019    K 4.1 12/08/2019    CL 98 12/08/2019    CO2 25.0 12/08/2019    GLU 92 12/08/2019    CA 9.3 12/08/2019    ALB 3.0 12/08/2019    ALKPHO 63 12/08/2019    BILT 1.2 12/08/2019    TP 6.8 12/08/2019    AST 22 12/08/2019 Physical Therapy Note signed by Tracy Borrego PTA at 12/13/2019  8:58 AM  Version 1 of 1    Author:  Tracy Borrego PTA Service:  Rehab Author Type:  Physical Therapy Assistant    Filed:  12/13/2019  8:58 AM Date of Service:  12/12/2019  3:54 PM BALANCE[BR.1]                                                                                                                     Static Sitting: Fair -  Dynamic Sitting: Poor +           Static Standing: Poor +  Dynamic Standing: Poor[BR.3]    ACTIVITY TO first step stating\" I don't feel safe. \"[BR.2]    THERAPEUTIC EXERCISES  Lower Extremity[BR.1] Alternating marching  Ankle pumps  Heel raises  LAQ   All above in sitting 10 reps each.   Standing marching for 1 min  Hip abd 10 reps   Knee flexion x 5 reps[B BR.2 - Saida Gardner, PTA on 12/13/2019  8:49 AM  BR. 3 - Yadiel Matthews, MARGOTH on 12/12/2019  3:55 PM               Physical Therapy Note signed by Yadiel Matthews PTA at 12/12/2019  3:53 PM  Version 1 of 1    Author:  Royce BARRERA, PTA Se Patient with a functional oropharyngeal swallow with functional oral acceptance, retrieval, containment, timely mastication despite being edentulous, apparent timely initiation of the pharyngeal swallow with functional hyolaryngeal elevation on palpation. SUBJECTIVE       OBJECTIVE   ORAL MOTOR EXAMINATION  Dentition: Edentulous  Symmetry: Within Functional Limits  Strength:  Within Functional Limits  Tone: Within Functional Limits  Range of Motion: Within Functional Limits  Rate of Motion: Within [facility] Description:  Patient's Short Term Goal: blood pressure improved    Interventions:   - monitor for sbp <90, start neosynephrine if needed  - monitor fluid status  - antibiotics  - See additional Care Plan goals for specific interventions                Inp Duration of Treatment 3.5 Hours    Dry weight (kg) or fluid removal (L) UF 2 liters    Access Site AVF        12/06/19 1031    12/05/19 0000  Hemodialysis inpatient  Tomorrow     Question Answer Comment   K 2 mEq    Ca++ 2.5 mEq    Bicarb 32 mEq    Na 138 Present; Thrill;Bruit    Status  Deaccessed   Accessed   —   Accessed   Deaccessed    Bi[employer] Applied  No   No   —   No   No    If No, Reason Why?  —   —   —   —   —    Dressing Status  Clean;Dry; Intact   Clean;Dry; Intact   —   Clean;Dry; Intact   Clean;Dry Present; Thrill;Bruit   Present; Thrill;Bruit    Status  Deaccessed   Accessed   Accessed   Accessed   [employer]  No   No   —   —   No    If No, Reason Why?  —   —   —   —   —    Dressing Status  Clean;Dry; Intact   Clean;Dry; Intact   Chase Dressing Status  —   —   —   —   —    Site Condition  —   —   —   No complications   No complications    Dressing  —   —   —   —   —    Dressing Change Due  —   —   —   —   —    Drainage Description  —   —   —   —   —    HD Output  1000 mL   —   —   —   — Site Assessment  —   Dry;Clean; Intact       Reason for Continuing [employer]  —   Hemodialysis       AV Fistula  —   Present;Bruit; Thrill       Status  —   Deaccessed       Biopatch Applied  —   —       If No, Reason Why?  —   —       Dressing Status  —

## (undated) NOTE — IP AVS SNAPSHOT
[address]            (For Outpatient Use Only) Initial Admit Date: 11/30/2019   Inpt/Obs Admit Date: Inpt: 11/30/19 / Obs: N/A   Discharge Date:    Skip Della:  [de-identified]   MRN: [de-identified]   CSN: [id number]   CEID: [id number] Subscriber ID:  Pt Rel to Subscriber:    Hospital Account Financial Class: Medicare    December 13, 2019